# Patient Record
Sex: FEMALE | Race: OTHER | HISPANIC OR LATINO
[De-identification: names, ages, dates, MRNs, and addresses within clinical notes are randomized per-mention and may not be internally consistent; named-entity substitution may affect disease eponyms.]

---

## 2019-01-31 ENCOUNTER — LABORATORY RESULT (OUTPATIENT)
Age: 44
End: 2019-01-31

## 2019-01-31 ENCOUNTER — APPOINTMENT (OUTPATIENT)
Dept: OBGYN | Facility: CLINIC | Age: 44
End: 2019-01-31
Payer: COMMERCIAL

## 2019-01-31 VITALS
HEIGHT: 67 IN | BODY MASS INDEX: 28.56 KG/M2 | WEIGHT: 182 LBS | SYSTOLIC BLOOD PRESSURE: 130 MMHG | DIASTOLIC BLOOD PRESSURE: 80 MMHG

## 2019-01-31 DIAGNOSIS — O36.8190 DECREASED FETAL MOVEMENTS, UNSPECIFIED TRIMESTER, NOT APPLICABLE OR UNSPECIFIED: ICD-10-CM

## 2019-01-31 DIAGNOSIS — Z33.2 ENCOUNTER FOR ELECTIVE TERMINATION OF PREGNANCY: ICD-10-CM

## 2019-01-31 DIAGNOSIS — N94.89 OTHER SPECIFIED CONDITIONS ASSOCIATED WITH FEMALE GENITAL ORGANS AND MENSTRUAL CYCLE: ICD-10-CM

## 2019-01-31 DIAGNOSIS — Z30.09 ENCOUNTER FOR OTHER GENERAL COUNSELING AND ADVICE ON CONTRACEPTION: ICD-10-CM

## 2019-01-31 DIAGNOSIS — Z71.7 HUMAN IMMUNODEFICIENCY VIRUS [HIV] COUNSELING: ICD-10-CM

## 2019-01-31 DIAGNOSIS — E78.00 PURE HYPERCHOLESTEROLEMIA, UNSPECIFIED: ICD-10-CM

## 2019-01-31 DIAGNOSIS — Z29.13 ENCOUNTER FOR PROPHYLACTIC RHO(D) IMMUNE GLOBULIN: ICD-10-CM

## 2019-01-31 DIAGNOSIS — O09.529 SUPERVISION OF ELDERLY MULTIGRAVIDA, UNSPECIFIED TRIMESTER: ICD-10-CM

## 2019-01-31 DIAGNOSIS — L29.2 PRURITUS VULVAE: ICD-10-CM

## 2019-01-31 DIAGNOSIS — Z34.90 ENCOUNTER FOR SUPERVISION OF NORMAL PREGNANCY, UNSPECIFIED, UNSPECIFIED TRIMESTER: ICD-10-CM

## 2019-01-31 PROCEDURE — 99396 PREV VISIT EST AGE 40-64: CPT

## 2019-01-31 NOTE — HISTORY OF PRESENT ILLNESS
[___ Year(s) Ago] : [unfilled] year(s) ago [Good] : being in good health [Reproductive Age] : is of reproductive age [Definite:  ___ (Date)] : the last menstrual period was [unfilled] [Normal Amount/Duration] : was of a normal amount and duration [Regular Cycle Intervals] : periods have been regular [Frequency: Q ___ days] : menstrual periods occur approximately every [unfilled] days [Menarche Age: ____] : age at menarche was [unfilled] [Age of First Sexual [place] ___] : became sexually active at the age of [unfilled] [Male ___] : [unfilled] male [de-identified] : April 2016 [Continuous] : no continuous [Dull] : no dull [Sharp] : no sharp [Stabbing] : no stabbing [Throbbing] : no throbbing [Burning] : no burning [Itching] : no itching [Mass] : no mass [Stinging] : no stinging [Lesion] : no lesion [Soreness] : no soreness [Discharge] : no discharge [Localized Pain] : no localized pain [Mass (___cm)] : no palpable mass [Diffused Pain] : no diffused pain [Nipple Discharge] : no nipple discharge [Skin Color Change] : no skin color change [FreeTextEntry9] : the patient denies any breast complaints [Hot Flashes] : no hot flashes [Night Sweats] : no night sweats [Vaginal Itching] : no vaginal itching [Dyspareunia] : no dyspareunia [Mood Changes] : no mood changes [FreeTextEntry8] : the patient denies any menopausal symptoms [Spotting Between  Menses] : no spotting between menses [Menstrual Cramps] : no menstrual cramps [Currently In Menopause] : not currently in menopause [Experiencing Menopausal Sxs] : not experiencing menopausal symptoms [Sexually Active] : is not sexually active [Contraception] : does not use contraception [de-identified] : Current partner x 2014 >  October 2018      patient is currently not sexually active

## 2019-01-31 NOTE — CHIEF COMPLAINT
[Annual Visit] : annual visit [FreeTextEntry1] : Patient presents for annual GYN evaluation\par Patient reports one year history of mixed incontinence - stress urinary incontinence with coughing laughing sneezing, intermittent urge incontinence.  Patient has never yet lost urine after urge - has been able to hold it until she reaches the bathroom.  She reports nocturia twice per night.\par \par LMP 1/17\par Patient is no longer sexually active -  from her partner since October 2018\par \par

## 2019-01-31 NOTE — OB HISTORY
[Pregnancy History] : boy [___] : no pregnancy complications reported [FreeTextEntry1] : prenatal issues\par low lying placenta\par marginal cord insertion\par elderly multigravida\par rhogam candidate

## 2019-01-31 NOTE — PHYSICAL EXAM
[Awake] : awake [Alert] : alert [Acute Distress] : no acute distress [LAD] : no lymphadenopathy [Thyroid Nodule] : no thyroid nodule [Goiter] : no goiter [Mass] : no breast mass [Nipple Discharge] : no nipple discharge [Axillary LAD] : no axillary lymphadenopathy [Soft] : soft [Tender] : non tender [Distended] : not distended [H/Smegaly] : no hepatosplenomegaly [Oriented x3] : oriented to person, place, and time [Depressed Mood] : not depressed [Flat Affect] : affect not flat [Labia Majora] : labia major [Labia Minora] : labia minora [Normal] : clitoris [No Bleeding] : there was no active vaginal bleeding [Discharge] : had no discharge [IUD String] : did not have an IUD string protruding out [Pap Obtained] : a Pap smear was performed [Motion Tenderness] : there was no cervical motion tenderness [Anteversion] : anteverted [Tenderness] : nontender [Enlarged ___ wks] : not enlarged [Mass ___ cm] : no uterine mass was palpated [Uterine Adnexae] : were not tender and not enlarged [No Tenderness] : no rectal tenderness

## 2019-02-01 DIAGNOSIS — R92.2 INCONCLUSIVE MAMMOGRAM: ICD-10-CM

## 2019-02-03 LAB
C TRACH RRNA SPEC QL NAA+PROBE: NOT DETECTED
N GONORRHOEA RRNA SPEC QL NAA+PROBE: NOT DETECTED
SOURCE AMPLIFICATION: NORMAL

## 2019-02-07 ENCOUNTER — CLINICAL ADVICE (OUTPATIENT)
Age: 44
End: 2019-02-07

## 2019-02-11 ENCOUNTER — RESULT REVIEW (OUTPATIENT)
Age: 44
End: 2019-02-11

## 2019-02-21 PROBLEM — R92.2 INCONCLUSIVE MAMMOGRAM: Status: ACTIVE | Noted: 2019-02-07

## 2019-02-21 LAB — HPV HIGH+LOW RISK DNA PNL CVX: DETECTED

## 2019-03-05 ENCOUNTER — APPOINTMENT (OUTPATIENT)
Dept: OBGYN | Facility: CLINIC | Age: 44
End: 2019-03-05
Payer: COMMERCIAL

## 2019-03-05 ENCOUNTER — TRANSCRIPTION ENCOUNTER (OUTPATIENT)
Age: 44
End: 2019-03-05

## 2019-03-05 VITALS
DIASTOLIC BLOOD PRESSURE: 80 MMHG | HEIGHT: 67 IN | SYSTOLIC BLOOD PRESSURE: 132 MMHG | BODY MASS INDEX: 28.56 KG/M2 | WEIGHT: 182 LBS

## 2019-03-05 LAB
CYTOLOGY CVX/VAG DOC THIN PREP: NORMAL
HCG UR QL: NEGATIVE
QUALITY CONTROL: YES

## 2019-03-05 PROCEDURE — 81025 URINE PREGNANCY TEST: CPT

## 2019-03-05 PROCEDURE — 57454 BX/CURETT OF CERVIX W/SCOPE: CPT

## 2019-03-05 NOTE — END OF VISIT
[FreeTextEntry3] : \par \par Adequate colposcopy\par Nonstaining area with Lugol solution noted at 3:00 position\par Biopsy performed\par ECC performed\par Disposition pending biopsy results

## 2019-03-05 NOTE — PROCEDURE
[Colposcopy] : colposcopy [HPV high risk] : PCR positive for high risk HPV [LGSIL] : low grade squamous intraepithelial lesion [Patient] : patient [Risks] : risks [Benefits] : benefits [Alternatives] : alternatives [Infection] : infection [Bleeding] : bleeding [Allergic Reaction] : allergic reaction [Consent Obtained] : written consent was obtained prior to the procedure [Tolerated Well] : the patient tolerated the procedure well [No Complications] : there were no complications [Pap Performed] : a cervical Pap smear was not performed [No Abnormalities] : no abnormalities [Non-staining ___ o'clock] : no staining at [unfilled] o'clock [Biopsies Taken: # ___] : [unfilled] biopsies taken of the cervix [Biopsy Locations ___ o'clock] : the biopsies were taken at [unfilled] o'clock [ECC Done] : Endocervical curettage was performed.  [Alicja's] : Alicja's solution

## 2019-03-08 LAB — CORE LAB BIOPSY: NORMAL

## 2019-08-05 ENCOUNTER — OTHER (OUTPATIENT)
Age: 44
End: 2019-08-05

## 2020-02-18 ENCOUNTER — APPOINTMENT (OUTPATIENT)
Dept: OBGYN | Facility: CLINIC | Age: 45
End: 2020-02-18
Payer: COMMERCIAL

## 2020-02-18 VITALS
HEIGHT: 67 IN | SYSTOLIC BLOOD PRESSURE: 116 MMHG | WEIGHT: 183 LBS | BODY MASS INDEX: 28.72 KG/M2 | DIASTOLIC BLOOD PRESSURE: 82 MMHG

## 2020-02-18 DIAGNOSIS — R87.810 CERVICAL HIGH RISK HUMAN PAPILLOMAVIRUS (HPV) DNA TEST POSITIVE: ICD-10-CM

## 2020-02-18 DIAGNOSIS — Z01.419 ENCOUNTER FOR GYNECOLOGICAL EXAMINATION (GENERAL) (ROUTINE) W/OUT ABNORMAL FINDINGS: ICD-10-CM

## 2020-02-18 PROCEDURE — 99396 PREV VISIT EST AGE 40-64: CPT

## 2020-02-18 NOTE — HISTORY OF PRESENT ILLNESS
[Last Pap ___] : Last cervical pap smear was [unfilled] [Last Mammogram ___] : Last Mammogram was [unfilled] [Reproductive Age] : is of reproductive age

## 2020-02-18 NOTE — CHIEF COMPLAINT
[Annual Visit] : annual visit [FreeTextEntry1] : History of LGSIL\par negative colposocpy \par Patient is not sexually active and does not want birth control

## 2020-02-18 NOTE — PHYSICAL EXAM
[Awake] : awake [Acute Distress] : no acute distress [Alert] : alert [Mass] : no breast mass [Nipple Discharge] : no nipple discharge [Axillary LAD] : no axillary lymphadenopathy [Tender] : non tender [Soft] : soft [Oriented x3] : oriented to person, place, and time [Normal] : vagina [Uterine Adnexae] : were not tender and not enlarged [No Bleeding] : there was no active vaginal bleeding

## 2020-02-19 LAB — HPV HIGH+LOW RISK DNA PNL CVX: NOT DETECTED

## 2020-02-24 LAB — CYTOLOGY CVX/VAG DOC THIN PREP: NORMAL

## 2020-07-13 DIAGNOSIS — R10.2 PELVIC AND PERINEAL PAIN: ICD-10-CM

## 2020-07-17 ENCOUNTER — APPOINTMENT (OUTPATIENT)
Dept: OBGYN | Facility: CLINIC | Age: 45
End: 2020-07-17
Payer: COMMERCIAL

## 2020-07-17 VITALS
DIASTOLIC BLOOD PRESSURE: 83 MMHG | SYSTOLIC BLOOD PRESSURE: 129 MMHG | HEIGHT: 67 IN | WEIGHT: 184.5 LBS | BODY MASS INDEX: 28.96 KG/M2

## 2020-07-17 PROCEDURE — 99213 OFFICE O/P EST LOW 20 MIN: CPT

## 2020-09-02 ENCOUNTER — APPOINTMENT (OUTPATIENT)
Dept: OBGYN | Facility: CLINIC | Age: 45
End: 2020-09-02
Payer: COMMERCIAL

## 2020-09-02 VITALS
SYSTOLIC BLOOD PRESSURE: 108 MMHG | WEIGHT: 181 LBS | BODY MASS INDEX: 28.41 KG/M2 | HEIGHT: 67 IN | DIASTOLIC BLOOD PRESSURE: 70 MMHG

## 2020-09-02 PROCEDURE — 99213 OFFICE O/P EST LOW 20 MIN: CPT

## 2020-09-03 LAB — CANCER AG125 SERPL-ACNC: 31 U/ML

## 2020-10-15 ENCOUNTER — FORM ENCOUNTER (OUTPATIENT)
Age: 45
End: 2020-10-15

## 2020-10-16 ENCOUNTER — APPOINTMENT (OUTPATIENT)
Dept: OBGYN | Facility: CLINIC | Age: 45
End: 2020-10-16
Payer: COMMERCIAL

## 2020-10-16 PROCEDURE — 99243 OFF/OP CNSLTJ NEW/EST LOW 30: CPT

## 2020-11-26 ENCOUNTER — FORM ENCOUNTER (OUTPATIENT)
Age: 45
End: 2020-11-26

## 2021-01-18 ENCOUNTER — FORM ENCOUNTER (OUTPATIENT)
Age: 46
End: 2021-01-18

## 2021-01-29 ENCOUNTER — INPATIENT (INPATIENT)
Facility: HOSPITAL | Age: 46
LOS: 1 days | Discharge: HOME | DRG: 872 | End: 2021-01-31
Attending: OBSTETRICS & GYNECOLOGY | Admitting: OBSTETRICS & GYNECOLOGY
Payer: COMMERCIAL

## 2021-01-29 VITALS
SYSTOLIC BLOOD PRESSURE: 119 MMHG | HEIGHT: 67 IN | WEIGHT: 184.09 LBS | DIASTOLIC BLOOD PRESSURE: 79 MMHG | OXYGEN SATURATION: 100 % | RESPIRATION RATE: 17 BRPM | HEART RATE: 140 BPM | TEMPERATURE: 98 F

## 2021-01-29 DIAGNOSIS — Z98.890 OTHER SPECIFIED POSTPROCEDURAL STATES: Chronic | ICD-10-CM

## 2021-01-29 DIAGNOSIS — N80.9 ENDOMETRIOSIS, UNSPECIFIED: ICD-10-CM

## 2021-01-29 LAB
ALBUMIN SERPL ELPH-MCNC: 4.4 G/DL — SIGNIFICANT CHANGE UP (ref 3.3–5)
ALP SERPL-CCNC: 100 U/L — SIGNIFICANT CHANGE UP (ref 40–120)
ALT FLD-CCNC: 20 U/L — SIGNIFICANT CHANGE UP (ref 10–45)
ANION GAP SERPL CALC-SCNC: 13 MMOL/L — SIGNIFICANT CHANGE UP (ref 5–17)
APPEARANCE UR: CLEAR — SIGNIFICANT CHANGE UP
APTT BLD: 28 SEC — SIGNIFICANT CHANGE UP (ref 27.5–35.5)
AST SERPL-CCNC: 17 U/L — SIGNIFICANT CHANGE UP (ref 10–40)
BACTERIA # UR AUTO: NEGATIVE — SIGNIFICANT CHANGE UP
BASE EXCESS BLDV CALC-SCNC: 0.4 MMOL/L — SIGNIFICANT CHANGE UP (ref -2–2)
BASOPHILS # BLD AUTO: 0.02 K/UL — SIGNIFICANT CHANGE UP (ref 0–0.2)
BASOPHILS # BLD AUTO: 0.04 K/UL — SIGNIFICANT CHANGE UP (ref 0–0.2)
BASOPHILS NFR BLD AUTO: 0.1 % — SIGNIFICANT CHANGE UP (ref 0–2)
BASOPHILS NFR BLD AUTO: 0.2 % — SIGNIFICANT CHANGE UP (ref 0–2)
BILIRUB SERPL-MCNC: 1.7 MG/DL — HIGH (ref 0.2–1.2)
BILIRUB UR-MCNC: NEGATIVE — SIGNIFICANT CHANGE UP
BUN SERPL-MCNC: 21 MG/DL — SIGNIFICANT CHANGE UP (ref 7–23)
CA-I SERPL-SCNC: 1.26 MMOL/L — SIGNIFICANT CHANGE UP (ref 1.12–1.3)
CALCIUM SERPL-MCNC: 10.3 MG/DL — SIGNIFICANT CHANGE UP (ref 8.4–10.5)
CHLORIDE BLDV-SCNC: 105 MMOL/L — SIGNIFICANT CHANGE UP (ref 96–108)
CHLORIDE SERPL-SCNC: 98 MMOL/L — SIGNIFICANT CHANGE UP (ref 96–108)
CO2 BLDV-SCNC: 28 MMOL/L — SIGNIFICANT CHANGE UP (ref 22–30)
CO2 SERPL-SCNC: 24 MMOL/L — SIGNIFICANT CHANGE UP (ref 22–31)
COLOR SPEC: YELLOW — SIGNIFICANT CHANGE UP
CREAT SERPL-MCNC: 1.18 MG/DL — SIGNIFICANT CHANGE UP (ref 0.5–1.3)
DIFF PNL FLD: ABNORMAL
EOSINOPHIL # BLD AUTO: 0 K/UL — SIGNIFICANT CHANGE UP (ref 0–0.5)
EOSINOPHIL # BLD AUTO: 0.07 K/UL — SIGNIFICANT CHANGE UP (ref 0–0.5)
EOSINOPHIL NFR BLD AUTO: 0 % — SIGNIFICANT CHANGE UP (ref 0–6)
EOSINOPHIL NFR BLD AUTO: 0.3 % — SIGNIFICANT CHANGE UP (ref 0–6)
EPI CELLS # UR: 4 /HPF — SIGNIFICANT CHANGE UP
GAS PNL BLDV: 138 MMOL/L — SIGNIFICANT CHANGE UP (ref 135–145)
GAS PNL BLDV: SIGNIFICANT CHANGE UP
GAS PNL BLDV: SIGNIFICANT CHANGE UP
GLUCOSE BLDV-MCNC: 98 MG/DL — SIGNIFICANT CHANGE UP (ref 70–99)
GLUCOSE SERPL-MCNC: 108 MG/DL — HIGH (ref 70–99)
GLUCOSE UR QL: NEGATIVE — SIGNIFICANT CHANGE UP
HCG SERPL-ACNC: <2 MIU/ML — SIGNIFICANT CHANGE UP
HCO3 BLDV-SCNC: 26 MMOL/L — SIGNIFICANT CHANGE UP (ref 21–29)
HCT VFR BLD CALC: 36.9 % — SIGNIFICANT CHANGE UP (ref 34.5–45)
HCT VFR BLD CALC: 43.5 % — SIGNIFICANT CHANGE UP (ref 34.5–45)
HCT VFR BLDA CALC: 45 % — SIGNIFICANT CHANGE UP (ref 39–50)
HGB BLD CALC-MCNC: 14.7 G/DL — SIGNIFICANT CHANGE UP (ref 11.5–15.5)
HGB BLD-MCNC: 12.3 G/DL — SIGNIFICANT CHANGE UP (ref 11.5–15.5)
HGB BLD-MCNC: 15 G/DL — SIGNIFICANT CHANGE UP (ref 11.5–15.5)
HYALINE CASTS # UR AUTO: 33 /LPF — HIGH (ref 0–2)
IMM GRANULOCYTES NFR BLD AUTO: 0.6 % — SIGNIFICANT CHANGE UP (ref 0–1.5)
IMM GRANULOCYTES NFR BLD AUTO: 0.6 % — SIGNIFICANT CHANGE UP (ref 0–1.5)
INR BLD: 1.32 RATIO — HIGH (ref 0.88–1.16)
KETONES UR-MCNC: ABNORMAL
LACTATE BLDV-MCNC: 1.7 MMOL/L — SIGNIFICANT CHANGE UP (ref 0.7–2)
LEUKOCYTE ESTERASE UR-ACNC: NEGATIVE — SIGNIFICANT CHANGE UP
LYMPHOCYTES # BLD AUTO: 1.07 K/UL — SIGNIFICANT CHANGE UP (ref 1–3.3)
LYMPHOCYTES # BLD AUTO: 1.42 K/UL — SIGNIFICANT CHANGE UP (ref 1–3.3)
LYMPHOCYTES # BLD AUTO: 5.2 % — LOW (ref 13–44)
LYMPHOCYTES # BLD AUTO: 7.4 % — LOW (ref 13–44)
MCHC RBC-ENTMCNC: 28.7 PG — SIGNIFICANT CHANGE UP (ref 27–34)
MCHC RBC-ENTMCNC: 29.8 PG — SIGNIFICANT CHANGE UP (ref 27–34)
MCHC RBC-ENTMCNC: 33.3 GM/DL — SIGNIFICANT CHANGE UP (ref 32–36)
MCHC RBC-ENTMCNC: 34.5 GM/DL — SIGNIFICANT CHANGE UP (ref 32–36)
MCV RBC AUTO: 86 FL — SIGNIFICANT CHANGE UP (ref 80–100)
MCV RBC AUTO: 86.5 FL — SIGNIFICANT CHANGE UP (ref 80–100)
MONOCYTES # BLD AUTO: 1.03 K/UL — HIGH (ref 0–0.9)
MONOCYTES # BLD AUTO: 1.26 K/UL — HIGH (ref 0–0.9)
MONOCYTES NFR BLD AUTO: 5.4 % — SIGNIFICANT CHANGE UP (ref 2–14)
MONOCYTES NFR BLD AUTO: 6.1 % — SIGNIFICANT CHANGE UP (ref 2–14)
NEUTROPHILS # BLD AUTO: 16.5 K/UL — HIGH (ref 1.8–7.4)
NEUTROPHILS # BLD AUTO: 18.11 K/UL — HIGH (ref 1.8–7.4)
NEUTROPHILS NFR BLD AUTO: 86.5 % — HIGH (ref 43–77)
NEUTROPHILS NFR BLD AUTO: 87.6 % — HIGH (ref 43–77)
NITRITE UR-MCNC: NEGATIVE — SIGNIFICANT CHANGE UP
NRBC # BLD: 0 /100 WBCS — SIGNIFICANT CHANGE UP (ref 0–0)
NRBC # BLD: 0 /100 WBCS — SIGNIFICANT CHANGE UP (ref 0–0)
PCO2 BLDV: 49 MMHG — SIGNIFICANT CHANGE UP (ref 35–50)
PH BLDV: 7.35 — SIGNIFICANT CHANGE UP (ref 7.35–7.45)
PH UR: 5.5 — SIGNIFICANT CHANGE UP (ref 5–8)
PLATELET # BLD AUTO: 210 K/UL — SIGNIFICANT CHANGE UP (ref 150–400)
PLATELET # BLD AUTO: 233 K/UL — SIGNIFICANT CHANGE UP (ref 150–400)
PO2 BLDV: 24 MMHG — LOW (ref 25–45)
POTASSIUM BLDV-SCNC: 3.9 MMOL/L — SIGNIFICANT CHANGE UP (ref 3.5–5.3)
POTASSIUM SERPL-MCNC: 3.8 MMOL/L — SIGNIFICANT CHANGE UP (ref 3.5–5.3)
POTASSIUM SERPL-SCNC: 3.8 MMOL/L — SIGNIFICANT CHANGE UP (ref 3.5–5.3)
PROT SERPL-MCNC: 8.3 G/DL — SIGNIFICANT CHANGE UP (ref 6–8.3)
PROT UR-MCNC: ABNORMAL
PROTHROM AB SERPL-ACNC: 15.6 SEC — HIGH (ref 10.6–13.6)
RBC # BLD: 4.29 M/UL — SIGNIFICANT CHANGE UP (ref 3.8–5.2)
RBC # BLD: 5.03 M/UL — SIGNIFICANT CHANGE UP (ref 3.8–5.2)
RBC # FLD: 12.8 % — SIGNIFICANT CHANGE UP (ref 10.3–14.5)
RBC # FLD: 12.8 % — SIGNIFICANT CHANGE UP (ref 10.3–14.5)
RBC CASTS # UR COMP ASSIST: 13 /HPF — HIGH (ref 0–4)
SAO2 % BLDV: 36 % — LOW (ref 67–88)
SARS-COV-2 RNA SPEC QL NAA+PROBE: SIGNIFICANT CHANGE UP
SODIUM SERPL-SCNC: 135 MMOL/L — SIGNIFICANT CHANGE UP (ref 135–145)
SP GR SPEC: 1.05 — HIGH (ref 1.01–1.02)
UROBILINOGEN FLD QL: ABNORMAL
WBC # BLD: 19.09 K/UL — HIGH (ref 3.8–10.5)
WBC # BLD: 20.68 K/UL — HIGH (ref 3.8–10.5)
WBC # FLD AUTO: 19.09 K/UL — HIGH (ref 3.8–10.5)
WBC # FLD AUTO: 20.68 K/UL — HIGH (ref 3.8–10.5)
WBC UR QL: 6 /HPF — HIGH (ref 0–5)

## 2021-01-29 PROCEDURE — 99285 EMERGENCY DEPT VISIT HI MDM: CPT

## 2021-01-29 PROCEDURE — 76830 TRANSVAGINAL US NON-OB: CPT | Mod: 26

## 2021-01-29 PROCEDURE — 93010 ELECTROCARDIOGRAM REPORT: CPT

## 2021-01-29 PROCEDURE — G1004: CPT

## 2021-01-29 PROCEDURE — 74177 CT ABD & PELVIS W/CONTRAST: CPT | Mod: 26,MG

## 2021-01-29 PROCEDURE — 71046 X-RAY EXAM CHEST 2 VIEWS: CPT | Mod: 26

## 2021-01-29 PROCEDURE — 99222 1ST HOSP IP/OBS MODERATE 55: CPT

## 2021-01-29 PROCEDURE — 93975 VASCULAR STUDY: CPT | Mod: 26

## 2021-01-29 RX ORDER — SODIUM CHLORIDE 9 MG/ML
1000 INJECTION INTRAMUSCULAR; INTRAVENOUS; SUBCUTANEOUS ONCE
Refills: 0 | Status: COMPLETED | OUTPATIENT
Start: 2021-01-29 | End: 2021-01-29

## 2021-01-29 RX ORDER — KETOROLAC TROMETHAMINE 30 MG/ML
15 SYRINGE (ML) INJECTION ONCE
Refills: 0 | Status: DISCONTINUED | OUTPATIENT
Start: 2021-01-29 | End: 2021-01-29

## 2021-01-29 RX ORDER — POLYETHYLENE GLYCOL 3350 17 G/17G
17 POWDER, FOR SOLUTION ORAL EVERY 12 HOURS
Refills: 0 | Status: DISCONTINUED | OUTPATIENT
Start: 2021-01-29 | End: 2021-01-31

## 2021-01-29 RX ORDER — ACETAMINOPHEN 500 MG
650 TABLET ORAL ONCE
Refills: 0 | Status: COMPLETED | OUTPATIENT
Start: 2021-01-29 | End: 2021-01-29

## 2021-01-29 RX ORDER — SODIUM CHLORIDE 9 MG/ML
1000 INJECTION, SOLUTION INTRAVENOUS
Refills: 0 | Status: DISCONTINUED | OUTPATIENT
Start: 2021-01-29 | End: 2021-01-31

## 2021-01-29 RX ORDER — PIPERACILLIN AND TAZOBACTAM 4; .5 G/20ML; G/20ML
3.38 INJECTION, POWDER, LYOPHILIZED, FOR SOLUTION INTRAVENOUS EVERY 8 HOURS
Refills: 0 | Status: DISCONTINUED | OUTPATIENT
Start: 2021-01-30 | End: 2021-01-31

## 2021-01-29 RX ORDER — KETOROLAC TROMETHAMINE 30 MG/ML
30 SYRINGE (ML) INJECTION EVERY 6 HOURS
Refills: 0 | Status: DISCONTINUED | OUTPATIENT
Start: 2021-01-29 | End: 2021-01-31

## 2021-01-29 RX ORDER — PIPERACILLIN AND TAZOBACTAM 4; .5 G/20ML; G/20ML
3.38 INJECTION, POWDER, LYOPHILIZED, FOR SOLUTION INTRAVENOUS ONCE
Refills: 0 | Status: COMPLETED | OUTPATIENT
Start: 2021-01-29 | End: 2021-01-29

## 2021-01-29 RX ORDER — DIPHENHYDRAMINE HCL 50 MG
25 CAPSULE ORAL EVERY 4 HOURS
Refills: 0 | Status: DISCONTINUED | OUTPATIENT
Start: 2021-01-29 | End: 2021-01-31

## 2021-01-29 RX ADMIN — Medication 15 MILLIGRAM(S): at 16:19

## 2021-01-29 RX ADMIN — PIPERACILLIN AND TAZOBACTAM 200 GRAM(S): 4; .5 INJECTION, POWDER, LYOPHILIZED, FOR SOLUTION INTRAVENOUS at 19:25

## 2021-01-29 RX ADMIN — SODIUM CHLORIDE 1000 MILLILITER(S): 9 INJECTION INTRAMUSCULAR; INTRAVENOUS; SUBCUTANEOUS at 14:10

## 2021-01-29 RX ADMIN — Medication 650 MILLIGRAM(S): at 20:04

## 2021-01-29 RX ADMIN — SODIUM CHLORIDE 1000 MILLILITER(S): 9 INJECTION INTRAMUSCULAR; INTRAVENOUS; SUBCUTANEOUS at 20:04

## 2021-01-29 RX ADMIN — SODIUM CHLORIDE 1000 MILLILITER(S): 9 INJECTION INTRAMUSCULAR; INTRAVENOUS; SUBCUTANEOUS at 16:12

## 2021-01-29 RX ADMIN — SODIUM CHLORIDE 1000 MILLILITER(S): 9 INJECTION INTRAMUSCULAR; INTRAVENOUS; SUBCUTANEOUS at 16:13

## 2021-01-29 NOTE — ED PROVIDER NOTE - ATTENDING CONTRIBUTION TO CARE
44 y/o f with pmhx ovarian cysts, presents for worsening abd pain. had similar pain in Aug and Sept and Dec. patient last had US ib 12/24 which demonstrated right side cyst with mod large size, and left ovary with 2 smaller cysts. + nausea. no vomiting. used hot pack therapy with some relief, current pain is 5/10. no urinary complaints. no diarrhea. last bm was yest. no headache. pain is on both sides but greater on right side.  she called Dr. Vidya stephenson her OB who rec she come to ED. has schedule surg on 2/16/21 for lap ovarian cystectomy.  Gen.  no resp distress, mild discomfort from pain  HEENT:  perrl eomi  Lungs:  b/l bs  CVS: S1S2   Abd;  soft  no distention, tender on right side greater than left side. no suprapubic tend. no cva tender. no guarding. min ttp on right side.   Ext: no pitting edema no erythema  Neuro: aaox3 no focal deficits  MSK: moves all ext spon.

## 2021-01-29 NOTE — ED PROVIDER NOTE - PROGRESS NOTE DETAILS
Pt offered pain meds but declined at this time  Vivienne West PA-C Pura Baca MD HR in the 160s, pt reached down to grab something; reports buttock pain, she is currently getting 1L of IVF infused she will get ketorolac, ekg obtained w/ HR in the 110s, sinus, Spoke to GYN again now that all images returned,  will see patient shortly   Vivienne West PA-C Pura Baca MD seen by Dr. Fonseca, recommending zosyn and admission to gyn

## 2021-01-29 NOTE — ED PROVIDER NOTE - PHYSICAL EXAMINATION
CONSTITUTIONAL: Patient is awake, alert and oriented x 3. Patient is well appearing and in no acute distress  HEAD: NCAT   NECK: supple, FROM  LUNGS: CTA B/L  HEART: RRR  ABDOMEN: Soft, non-distended, mild diffuse ttp in lower half abdomen, no rebound or guarding  EXTREMITY: no edema or calf tenderness b/l, FROM upper and lower ext b/l  SKIN: with no rash or lesions.   NEURO: No focal deficits CONSTITUTIONAL: Patient is awake, alert and oriented x 3. Patient is well appearing and in no acute distress  HEAD: NCAT   NECK: supple, FROM  LUNGS: CTA B/L  HEART: Tachy, RR  ABDOMEN: Soft, non-distended, mild diffuse ttp in lower half abdomen, no rebound or guarding  EXTREMITY: no edema or calf tenderness b/l, FROM upper and lower ext b/l  SKIN: with no rash or lesions.   NEURO: No focal deficits CONSTITUTIONAL: Patient is awake, alert and oriented x 3. Patient is well appearing and in no acute distress  HEAD: NCAT   NECK: supple, FROM  LUNGS: CTA B/L  HEART: Tachy, RR  ABDOMEN: Soft, non-distended, mild diffuse ttp in lower half abdomen, no rebound or guarding  PELVIC: no vulvar lesions, some white/clear discharge on speculum exam. on bimanual exam no cmt, + right adnexal ttp   EXTREMITY: no edema or calf tenderness b/l, FROM upper and lower ext b/l  SKIN: with no rash or lesions.   NEURO: No focal deficits

## 2021-01-29 NOTE — H&P ADULT - NSHPLABSRESULTS_GEN_ALL_CORE
15.0   20.68 )-----------( 233      ( 2021 12:12 )             43.5           135  |  98  |  21  ----------------------------<  108<H>  3.8   |  24  |  1.18    Ca    10.3      2021 12:12    TPro  8.3  /  Alb  4.4  /  TBili  1.7<H>  /  DBili  x   /  AST  17  /  ALT  20  /  AlkPhos  100                Urinalysis Basic - ( 2021 15:47 )    Color: Yellow / Appearance: Clear / S.049 / pH: x  Gluc: x / Ketone: Small  / Bili: Negative / Urobili: 3 mg/dL   Blood: x / Protein: 30 mg/dL / Nitrite: Negative   Leuk Esterase: Negative / RBC: 13 /hpf / WBC 6 /HPF   Sq Epi: x / Non Sq Epi: 4 /hpf / Bacteria: Negative    PT/INR - ( 2021 12:12 )   PT: 15.6 sec;   INR: 1.32 ratio       PTT - ( 2021 12:12 )  PTT:28.0 sec              RADIOLOGY & ADDITIONAL STUDIES:  < from: US Transvaginal (21 @ 12:58) >  EXAM:  US TRANSVAGINAL                        EXAM:  US DPLX PELVIC                            PROCEDURE DATE:  2021        INTERPRETATION:  CLINICAL INFORMATION: Lower abdominal pain. History of bilateral ovarian cystic lesions. Leukocytosis.    LMP: 2021.    COMPARISON: Outside report from 2020.    TECHNIQUE:  Endovaginal pelvic sonogram as per order. Transabdominal pelvic sonogram was also performed as part of our standard protocol. Color and Spectral Doppler was performed.    FINDINGS:    Uterus: 7.0 x 3.9 x 5.3 cm. Within normal limits.  Endometrium: 10 mm. Within normal limits in this premenopausal female.    Right ovary: poorly delineated, measures approximately 5.9 cm x 4.2 cm x 5.0 cm. There is a 4.9 x 4.0 x 3.3 cm cystic structure with a fluid-fluid level (layering echogenic material), which may represent a hemorrhagic cyst versus endometrioma. Fat is considered less likely. Normal arterial and venous waveform in the region of the right ovary.    Left ovary: poorly delineated, measures approximately 4.6 cm x 4.9 cm x 3.0 cm. Two cystic structures with low level internal echoes, measuring 2.4 x 1.7 x 1.8 cm and 2.3 x 2.0 cm, which were mentioned in prior ultrasound report, not significantly changed in size. Normal arterial and venous waveforms.    There is a 9.0 x 4.1 x 4.5 cm collection with thick septations and no apparent internal vascularity, located anterior to the uterus, which is indeterminate, possibilities include infection/pelvic inflammatory disease (taking into account patient's leukocytosis), ruptured hemorrhagic cyst with hemorrhage    Fluid: Trace free fluid in the pelvis.    IMPRESSION:  Enlarged bilateral ovaries. A 4.9 cm probably hemorrhagic cyst versus endometrioma in the right ovary. Two hemorrhagic cysts versus endometriomas in the left ovary.    A 9.0 x 4.1 x 4.5 cm complex collection anterior to the uterus, possibilities include infection/pelvic inflammatory disease, hematoma from ruptured hemorrhagic cyst, less likely ruptured ectopic pregnancy  (HCG level is recommended).    Pelvic MRI can be performed for further evaluation.    < end of copied text >

## 2021-01-29 NOTE — H&P ADULT - HISTORY OF PRESENT ILLNESS
DANIEL CESAR is a 45y  who presented to the ED with a chief complaint of worsening abdominal pain for the past few days. She has known hx of endometriosis and bilateral endometriomas with scheduled outpt surgery for resection on . On presentation to the ED pt has been tachy to 140s and labs notable for WBC of 20. She endorses some constipation but otherwise denies any CP/SOB/fever/chills/dysuria/vaginal bleeding/vaginal discharge. CTAP notable for R ovarian mass measuring 5x7cm and L ovarian mass measuring 3x4cm (consistent in size and character of her known endometriomas) and small free fluid with adjacent fat stranding.      OB/GYN HISTORY:     LMP: 21  (+) Trich > 20yrs ago, no STIs since then   Pt denies any hx of fibroids, (+) HPV, no hx of abnormal pap                                               Allergies:  No Known Allergies/Intolerances        PAST MEDICAL & SURGICAL HISTORY:  Gallstones  with surgery to remove gallstones  H/O oral surgery

## 2021-01-29 NOTE — ED ADULT NURSE NOTE - OBJECTIVE STATEMENT
45 year old female, a+ox4, presents to ED complaining of diffuse abd pain that started yesterday, but worsening today.  Pt has a bilateral ovarian cystectomy scheduled for 2/16/21.  Pt has been having ovarian cysts and pain associated with bilateral lower abd pain since November 2020.  Today presents with RLQ worse than left lower quadrant but pain is over generalized abd area. pt vomited 2 times after the pain started yesterday.  Currently not nauseous, no diarrhea.  LBM was 3 days ago (pt BMs are normally irregular).  Pt states her menstrual cycle is regular.  Lungs CTA unlabored. Abd soft tender, nondistended. no fevers, vaginal discharge, no sob, cough, chest pain, or bodyaches. skin w/d/i

## 2021-01-29 NOTE — H&P ADULT - NSHPREVIEWOFSYSTEMS_GEN_ALL_CORE
CONSTITUTIONAL: No weakness, fevers or chills  RESPIRATORY: No shortness of breath  CARDIOVASCULAR: No chest pain or palpitations  GASTROINTESTINAL: No nausea, vomiting; No diarrhea, (+) constipation  GENITOURINARY: No dysuria, frequency or hematuria  All other review of systems is negative unless indicated above

## 2021-01-29 NOTE — ED CLERICAL - NS ED CLERK NOTE PRE-ARRIVAL INFORMATION; ADDITIONAL PRE-ARRIVAL INFORMATION
CC/Reason For referral: Severe abdominal pain/vomiting. Patient has an of ruptured cysts.  Preferred Consultant(if applicable):  Who admits for you (if needed):  Do you have documents you would like to fax over? No  Would you still like to speak to an ED attending? [facility] Oncall Dr. Fonseca

## 2021-01-29 NOTE — H&P ADULT - NSHPPHYSICALEXAM_GEN_ALL_CORE
Vital Signs Last 24 Hrs  T(C): 36.7 (29 Jan 2021 10:40), Max: 36.7 (29 Jan 2021 10:40)  T(F): 98.1 (29 Jan 2021 10:40), Max: 98.1 (29 Jan 2021 10:40)  HR: 125 (29 Jan 2021 19:02) (109 - 144)  BP: 111/44 (29 Jan 2021 19:02) (107/47 - 124/79)  BP(mean): --  RR: 18 (29 Jan 2021 19:02) (16 - 18)  SpO2: 100% (29 Jan 2021 19:02) (100% - 100%)    PHYSICAL EXAM:  Constitutional: NAD, awake and alert, well-developed  HEENT: Normal Hearing  Respiratory: Breath sounds are clear bilaterally, No wheezing, rales or rhonchi  Cardiovascular: S1 and S2, regular rate and rhythm, no Murmurs, gallops or rubs  Gastrointestinal: soft, obese, moderately tender to palpation of bilateral lower quadrants and suprapubic region, nondistended, no guarding, no rebound  Genitourinary: no vaginal bleeding/foul smelling discharge, vaginal fluid culture and GC/CT obtained, cervical os closed, (-) CMT, tender on bimanual exam    Extremities: No peripheral edema, no popliteal masses, (-) lezama bilaterally   Neurological: A/O x 3, no focal deficits  Skin: No rashes

## 2021-01-29 NOTE — ED PROVIDER NOTE - OBJECTIVE STATEMENT
45 year old female with pmhx ruptured ovarian cyst presents to ED c/o lower abdominal pain starting yesterday at 4pm. Pt was folding laundry when she had sudden onset lower abdominal pain which feels very similar to prior ruptured ovarian cyst. States that she had one episode vomiting as well. Took Aleve yesterday with minimal relief and placed hot packs. Pain persisted into today and she spoke to her GYN who advised her to come to ED for eval. Pt is scheduled for ovarian cystectomy 2/16. Denies HA, dizziness, chest pain, sob, fevers, chills, diarrhea, urinary symptoms. She states she is due for her menstrual period in the next few days, denies current vaginal bleeding or spotting

## 2021-01-29 NOTE — H&P ADULT - ASSESSMENT
DANIEL CESAR is a 45y with known hx of endometriosis and bilateral endometriomas who presented to the ED with abdominal pain, tachycardia, elevated WBC count, and CTAP showing R ovarian endometrioma measuring 5x7cm and L ovarian endometrioma measuring 3x4cm with fat stranding surrounding pelvic structures. Pt to be admitted for management of infected endometriomas/PID.    Plan:   - IV zosyn  - 1L IVF bolus followed by LR @ 125ml/hr   - Toradol q6 prn for pain management   - Miralax for constipation    Pt assessed and plan of care discussed with Dr. Fonseca.

## 2021-01-29 NOTE — ED ADULT NURSE REASSESSMENT NOTE - NS ED NURSE REASSESS COMMENT FT1
Pt received from ANGELINA Casarez. Resting on stretcher, endorsing some back pain. HR elevated to 130s, MD Baca made aware, HR elevated since arrival no interventions at this time. A&Ox4, PALOMARES, distal pulses intact, abdomen soft nontender, skin intact. Side rails up for safety, bed in lowest position, call bell and personal items within reach, instructed to call for assistance, verbalizes understanding.  Will continue to monitor.

## 2021-01-30 DIAGNOSIS — A41.9 SEPSIS, UNSPECIFIED ORGANISM: ICD-10-CM

## 2021-01-30 LAB
BASOPHILS # BLD AUTO: 0.04 K/UL — SIGNIFICANT CHANGE UP (ref 0–0.2)
BASOPHILS NFR BLD AUTO: 0.3 % — SIGNIFICANT CHANGE UP (ref 0–2)
C TRACH RRNA SPEC QL NAA+PROBE: SIGNIFICANT CHANGE UP
CULTURE RESULTS: SIGNIFICANT CHANGE UP
EOSINOPHIL # BLD AUTO: 0.02 K/UL — SIGNIFICANT CHANGE UP (ref 0–0.5)
EOSINOPHIL NFR BLD AUTO: 0.1 % — SIGNIFICANT CHANGE UP (ref 0–6)
HCT VFR BLD CALC: 34.2 % — LOW (ref 34.5–45)
HGB BLD-MCNC: 11.3 G/DL — LOW (ref 11.5–15.5)
IMM GRANULOCYTES NFR BLD AUTO: 0.8 % — SIGNIFICANT CHANGE UP (ref 0–1.5)
LYMPHOCYTES # BLD AUTO: 1.33 K/UL — SIGNIFICANT CHANGE UP (ref 1–3.3)
LYMPHOCYTES # BLD AUTO: 9 % — LOW (ref 13–44)
MCHC RBC-ENTMCNC: 28.7 PG — SIGNIFICANT CHANGE UP (ref 27–34)
MCHC RBC-ENTMCNC: 33 GM/DL — SIGNIFICANT CHANGE UP (ref 32–36)
MCV RBC AUTO: 86.8 FL — SIGNIFICANT CHANGE UP (ref 80–100)
MONOCYTES # BLD AUTO: 0.95 K/UL — HIGH (ref 0–0.9)
MONOCYTES NFR BLD AUTO: 6.4 % — SIGNIFICANT CHANGE UP (ref 2–14)
N GONORRHOEA RRNA SPEC QL NAA+PROBE: SIGNIFICANT CHANGE UP
NEUTROPHILS # BLD AUTO: 12.34 K/UL — HIGH (ref 1.8–7.4)
NEUTROPHILS NFR BLD AUTO: 83.4 % — HIGH (ref 43–77)
NRBC # BLD: 0 /100 WBCS — SIGNIFICANT CHANGE UP (ref 0–0)
PLATELET # BLD AUTO: 187 K/UL — SIGNIFICANT CHANGE UP (ref 150–400)
RBC # BLD: 3.94 M/UL — SIGNIFICANT CHANGE UP (ref 3.8–5.2)
RBC # FLD: 12.9 % — SIGNIFICANT CHANGE UP (ref 10.3–14.5)
SPECIMEN SOURCE: SIGNIFICANT CHANGE UP
SPECIMEN SOURCE: SIGNIFICANT CHANGE UP
WBC # BLD: 14.8 K/UL — HIGH (ref 3.8–10.5)
WBC # FLD AUTO: 14.8 K/UL — HIGH (ref 3.8–10.5)

## 2021-01-30 PROCEDURE — 99232 SBSQ HOSP IP/OBS MODERATE 35: CPT

## 2021-01-30 RX ORDER — MAGNESIUM HYDROXIDE 400 MG/1
30 TABLET, CHEWABLE ORAL DAILY
Refills: 0 | Status: DISCONTINUED | OUTPATIENT
Start: 2021-01-30 | End: 2021-01-31

## 2021-01-30 RX ORDER — HEPARIN SODIUM 5000 [USP'U]/ML
5000 INJECTION INTRAVENOUS; SUBCUTANEOUS EVERY 12 HOURS
Refills: 0 | Status: DISCONTINUED | OUTPATIENT
Start: 2021-01-30 | End: 2021-01-31

## 2021-01-30 RX ORDER — ACETAMINOPHEN 500 MG
975 TABLET ORAL ONCE
Refills: 0 | Status: COMPLETED | OUTPATIENT
Start: 2021-01-30 | End: 2021-01-30

## 2021-01-30 RX ORDER — SODIUM CHLORIDE 9 MG/ML
1000 INJECTION, SOLUTION INTRAVENOUS ONCE
Refills: 0 | Status: COMPLETED | OUTPATIENT
Start: 2021-01-30 | End: 2021-01-30

## 2021-01-30 RX ORDER — LANOLIN ALCOHOL/MO/W.PET/CERES
3 CREAM (GRAM) TOPICAL AT BEDTIME
Refills: 0 | Status: DISCONTINUED | OUTPATIENT
Start: 2021-01-30 | End: 2021-01-31

## 2021-01-30 RX ORDER — ACETAMINOPHEN 500 MG
1000 TABLET ORAL ONCE
Refills: 0 | Status: COMPLETED | OUTPATIENT
Start: 2021-01-30 | End: 2021-01-30

## 2021-01-30 RX ORDER — SIMETHICONE 80 MG/1
80 TABLET, CHEWABLE ORAL
Refills: 0 | Status: DISCONTINUED | OUTPATIENT
Start: 2021-01-30 | End: 2021-01-31

## 2021-01-30 RX ORDER — LANOLIN ALCOHOL/MO/W.PET/CERES
3 CREAM (GRAM) TOPICAL ONCE
Refills: 0 | Status: COMPLETED | OUTPATIENT
Start: 2021-01-30 | End: 2021-01-30

## 2021-01-30 RX ADMIN — PIPERACILLIN AND TAZOBACTAM 25 GRAM(S): 4; .5 INJECTION, POWDER, LYOPHILIZED, FOR SOLUTION INTRAVENOUS at 17:14

## 2021-01-30 RX ADMIN — Medication 30 MILLIGRAM(S): at 12:50

## 2021-01-30 RX ADMIN — SODIUM CHLORIDE 125 MILLILITER(S): 9 INJECTION, SOLUTION INTRAVENOUS at 12:52

## 2021-01-30 RX ADMIN — Medication 975 MILLIGRAM(S): at 01:53

## 2021-01-30 RX ADMIN — Medication 400 MILLIGRAM(S): at 21:05

## 2021-01-30 RX ADMIN — HEPARIN SODIUM 5000 UNIT(S): 5000 INJECTION INTRAVENOUS; SUBCUTANEOUS at 20:06

## 2021-01-30 RX ADMIN — POLYETHYLENE GLYCOL 3350 17 GRAM(S): 17 POWDER, FOR SOLUTION ORAL at 00:15

## 2021-01-30 RX ADMIN — Medication 30 MILLIGRAM(S): at 18:21

## 2021-01-30 RX ADMIN — Medication 3 MILLIGRAM(S): at 21:04

## 2021-01-30 RX ADMIN — SODIUM CHLORIDE 1000 MILLILITER(S): 9 INJECTION, SOLUTION INTRAVENOUS at 08:45

## 2021-01-30 RX ADMIN — Medication 25 MILLIGRAM(S): at 00:15

## 2021-01-30 RX ADMIN — POLYETHYLENE GLYCOL 3350 17 GRAM(S): 17 POWDER, FOR SOLUTION ORAL at 09:01

## 2021-01-30 RX ADMIN — SIMETHICONE 80 MILLIGRAM(S): 80 TABLET, CHEWABLE ORAL at 02:41

## 2021-01-30 RX ADMIN — Medication 30 MILLIGRAM(S): at 00:43

## 2021-01-30 RX ADMIN — Medication 30 MILLIGRAM(S): at 06:22

## 2021-01-30 RX ADMIN — PIPERACILLIN AND TAZOBACTAM 25 GRAM(S): 4; .5 INJECTION, POWDER, LYOPHILIZED, FOR SOLUTION INTRAVENOUS at 00:43

## 2021-01-30 RX ADMIN — Medication 3 MILLIGRAM(S): at 08:45

## 2021-01-30 RX ADMIN — HEPARIN SODIUM 5000 UNIT(S): 5000 INJECTION INTRAVENOUS; SUBCUTANEOUS at 08:45

## 2021-01-30 RX ADMIN — PIPERACILLIN AND TAZOBACTAM 25 GRAM(S): 4; .5 INJECTION, POWDER, LYOPHILIZED, FOR SOLUTION INTRAVENOUS at 08:45

## 2021-01-30 NOTE — PROGRESS NOTE ADULT - PROBLEM SELECTOR PLAN 1
#Neuro: baseline back pain worsened with bed, Toradol ordered q6 prn and heating pads  #CV: Pt with tachycardia, 1L bolus ordered H/H 11/34  #Pulm: ROS and PE unremarkable  #ID: Afebrile, Zosyn initiated 1/29, Covid (-) on (1/29)  #GI: tolerating reg diet, no n/v  #: UOP low, 1L LR bolus ordered,  #FEN: LR @ 125. replete electrolytes prn   #Heme: HSQ, SCDs while in bed, and early ambulation encouraged for DVT prophylaxis    Disposition: continue routine care      Dilcia Hinojosa MD  OBGYN PGY2

## 2021-01-30 NOTE — PROGRESS NOTE ADULT - SUBJECTIVE AND OBJECTIVE BOX
#Neuro: baseline back pain worsened with bed, Toradol ordered q6 prn and heating pads  #CV: Pt with tachycardia  H/H wnl  #Pulm: ROS and PE unremarkable  #ID: Afebrile, Zosyn initiated 1/29, Covid (-) on (1/29)  #GI: tolerating reg diet, no n/v  #: UOP low, 1L LR bous ordered,  #FEN: LR @ 125. replete electrolytes prn   #Heme: HSQ, SCDs while in bed, and early ambulation encouraged for DVT prophylaxis    Disposition: continue routine care      Dilcia Hinojosa MD  OBGYN PGY2 Pt seen and examined at bedside. No overnight events.  Pt complaining of baseline back pain which is worsened by bed here, otherwise she states her abdominal pain is minimal and well controlled on current regimen. She denies SOB/CP/palpitations, fever/chills, nausea/emesis. Tolerating regular diet.  +OOB, +Voiding spontaneously but minimal output and (+) constipation     MEDICATIONS  (STANDING):  heparin   Injectable 5000 Unit(s) SubCutaneous every 12 hours  lactated ringers Bolus 1000 milliLiter(s) IV Bolus once  lactated ringers. 1000 milliLiter(s) (125 mL/Hr) IV Continuous <Continuous>  piperacillin/tazobactam IVPB.. 3.375 Gram(s) IV Intermittent every 8 hours  polyethylene glycol 3350 17 Gram(s) Oral every 12 hours    MEDICATIONS  (PRN):  diphenhydrAMINE 25 milliGRAM(s) Oral every 4 hours PRN Rash and/or Itching  ketorolac   Injectable 30 milliGRAM(s) IV Push every 6 hours PRN Moderate Pain (4 - 6)  magnesium hydroxide Suspension 30 milliLiter(s) Oral daily PRN Constipation  melatonin 3 milliGRAM(s) Oral once PRN Insomnia  simethicone 80 milliGRAM(s) Chew two times a day PRN Gas        Vital Signs Last 24 Hrs  T(C): 36.9 (30 Jan 2021 05:29), Max: 37 (29 Jan 2021 22:00)  T(F): 98.4 (30 Jan 2021 05:29), Max: 98.6 (29 Jan 2021 22:00)  HR: 110 (30 Jan 2021 05:29) (109 - 144)  BP: 95/65 (30 Jan 2021 05:29) (95/65 - 124/79)  BP(mean): --  RR: 18 (30 Jan 2021 05:29) (16 - 19)  SpO2: 97% (30 Jan 2021 05:29) (96% - 100%)    01-29 @ 07:01  -  01-30 @ 07:00  --------------------------------------------------------  IN: 1110 mL / OUT: 0 mL / NET: 1110 mL        PHYSICAL EXAM:  Gen: NAD, A+O x 3  CV: RRR  Pulm: CTA BL  Abd: Soft, minimally tender to deep palpation, obese, softly distended, no rebound or guarding  Extremities: No swelling or calf tenderness, (-) lezama bilaterally     Labs, additional tests:             11.3   14.80 )-----------( 187      ( 01-30 @ 06:57 )             34.2                12.3   19.09 )-----------( 210      ( 01-29 @ 20:31 )             36.9                15.0   20.68 )-----------( 233      ( 01-29 @ 12:12 )             43.5       01-29    135  |  98  |  21  ----------------------------<  108<H>  3.8   |  24  |  1.18    Ca    10.3      29 Jan 2021 12:12    TPro  8.3  /  Alb  4.4  /  TBili  1.7<H>  /  DBili  x   /  AST  17  /  ALT  20  /  AlkPhos  100  01-29

## 2021-01-31 ENCOUNTER — TRANSCRIPTION ENCOUNTER (OUTPATIENT)
Age: 46
End: 2021-01-31

## 2021-01-31 VITALS
HEART RATE: 94 BPM | SYSTOLIC BLOOD PRESSURE: 136 MMHG | OXYGEN SATURATION: 100 % | RESPIRATION RATE: 18 BRPM | DIASTOLIC BLOOD PRESSURE: 84 MMHG | TEMPERATURE: 99 F

## 2021-01-31 DIAGNOSIS — N80.9 ENDOMETRIOSIS, UNSPECIFIED: ICD-10-CM

## 2021-01-31 LAB
-  AMIKACIN: SIGNIFICANT CHANGE UP
-  AMOXICILLIN/CLAVULANIC ACID: SIGNIFICANT CHANGE UP
-  AMPICILLIN/SULBACTAM: SIGNIFICANT CHANGE UP
-  AMPICILLIN: SIGNIFICANT CHANGE UP
-  AZTREONAM: SIGNIFICANT CHANGE UP
-  CEFAZOLIN: SIGNIFICANT CHANGE UP
-  CEFEPIME: SIGNIFICANT CHANGE UP
-  CEFOXITIN: SIGNIFICANT CHANGE UP
-  CEFTRIAXONE: SIGNIFICANT CHANGE UP
-  CIPROFLOXACIN: SIGNIFICANT CHANGE UP
-  ERTAPENEM: SIGNIFICANT CHANGE UP
-  GENTAMICIN: SIGNIFICANT CHANGE UP
-  IMIPENEM: SIGNIFICANT CHANGE UP
-  LEVOFLOXACIN: SIGNIFICANT CHANGE UP
-  MEROPENEM: SIGNIFICANT CHANGE UP
-  PIPERACILLIN/TAZOBACTAM: SIGNIFICANT CHANGE UP
-  TOBRAMYCIN: SIGNIFICANT CHANGE UP
-  TRIMETHOPRIM/SULFAMETHOXAZOLE: SIGNIFICANT CHANGE UP
ALBUMIN SERPL ELPH-MCNC: 2.5 G/DL — LOW (ref 3.3–5)
ALP SERPL-CCNC: 74 U/L — SIGNIFICANT CHANGE UP (ref 40–120)
ALT FLD-CCNC: 11 U/L — SIGNIFICANT CHANGE UP (ref 10–45)
ANION GAP SERPL CALC-SCNC: 12 MMOL/L — SIGNIFICANT CHANGE UP (ref 5–17)
AST SERPL-CCNC: 10 U/L — SIGNIFICANT CHANGE UP (ref 10–40)
BASOPHILS # BLD AUTO: 0.01 K/UL — SIGNIFICANT CHANGE UP (ref 0–0.2)
BASOPHILS # BLD AUTO: 0.02 K/UL — SIGNIFICANT CHANGE UP (ref 0–0.2)
BASOPHILS NFR BLD AUTO: 0.1 % — SIGNIFICANT CHANGE UP (ref 0–2)
BASOPHILS NFR BLD AUTO: 0.2 % — SIGNIFICANT CHANGE UP (ref 0–2)
BILIRUB SERPL-MCNC: 0.6 MG/DL — SIGNIFICANT CHANGE UP (ref 0.2–1.2)
BUN SERPL-MCNC: 13 MG/DL — SIGNIFICANT CHANGE UP (ref 7–23)
CALCIUM SERPL-MCNC: 8.9 MG/DL — SIGNIFICANT CHANGE UP (ref 8.4–10.5)
CHLORIDE SERPL-SCNC: 107 MMOL/L — SIGNIFICANT CHANGE UP (ref 96–108)
CO2 SERPL-SCNC: 20 MMOL/L — LOW (ref 22–31)
CREAT SERPL-MCNC: 0.84 MG/DL — SIGNIFICANT CHANGE UP (ref 0.5–1.3)
CULTURE RESULTS: SIGNIFICANT CHANGE UP
EOSINOPHIL # BLD AUTO: 0.05 K/UL — SIGNIFICANT CHANGE UP (ref 0–0.5)
EOSINOPHIL # BLD AUTO: 0.06 K/UL — SIGNIFICANT CHANGE UP (ref 0–0.5)
EOSINOPHIL NFR BLD AUTO: 0.5 % — SIGNIFICANT CHANGE UP (ref 0–6)
EOSINOPHIL NFR BLD AUTO: 0.6 % — SIGNIFICANT CHANGE UP (ref 0–6)
GLUCOSE SERPL-MCNC: 103 MG/DL — HIGH (ref 70–99)
HCT VFR BLD CALC: 30.2 % — LOW (ref 34.5–45)
HCT VFR BLD CALC: 30.5 % — LOW (ref 34.5–45)
HGB BLD-MCNC: 10.2 G/DL — LOW (ref 11.5–15.5)
HGB BLD-MCNC: 9.9 G/DL — LOW (ref 11.5–15.5)
IMM GRANULOCYTES NFR BLD AUTO: 0.3 % — SIGNIFICANT CHANGE UP (ref 0–1.5)
IMM GRANULOCYTES NFR BLD AUTO: 0.5 % — SIGNIFICANT CHANGE UP (ref 0–1.5)
LYMPHOCYTES # BLD AUTO: 1.05 K/UL — SIGNIFICANT CHANGE UP (ref 1–3.3)
LYMPHOCYTES # BLD AUTO: 1.23 K/UL — SIGNIFICANT CHANGE UP (ref 1–3.3)
LYMPHOCYTES # BLD AUTO: 11.2 % — LOW (ref 13–44)
LYMPHOCYTES # BLD AUTO: 13.1 % — SIGNIFICANT CHANGE UP (ref 13–44)
MCHC RBC-ENTMCNC: 28.6 PG — SIGNIFICANT CHANGE UP (ref 27–34)
MCHC RBC-ENTMCNC: 29.2 PG — SIGNIFICANT CHANGE UP (ref 27–34)
MCHC RBC-ENTMCNC: 32.8 GM/DL — SIGNIFICANT CHANGE UP (ref 32–36)
MCHC RBC-ENTMCNC: 33.4 GM/DL — SIGNIFICANT CHANGE UP (ref 32–36)
MCV RBC AUTO: 87.3 FL — SIGNIFICANT CHANGE UP (ref 80–100)
MCV RBC AUTO: 87.4 FL — SIGNIFICANT CHANGE UP (ref 80–100)
METHOD TYPE: SIGNIFICANT CHANGE UP
MONOCYTES # BLD AUTO: 0.58 K/UL — SIGNIFICANT CHANGE UP (ref 0–0.9)
MONOCYTES # BLD AUTO: 0.59 K/UL — SIGNIFICANT CHANGE UP (ref 0–0.9)
MONOCYTES NFR BLD AUTO: 6.2 % — SIGNIFICANT CHANGE UP (ref 2–14)
MONOCYTES NFR BLD AUTO: 6.3 % — SIGNIFICANT CHANGE UP (ref 2–14)
NEUTROPHILS # BLD AUTO: 7.48 K/UL — HIGH (ref 1.8–7.4)
NEUTROPHILS # BLD AUTO: 7.6 K/UL — HIGH (ref 1.8–7.4)
NEUTROPHILS NFR BLD AUTO: 79.5 % — HIGH (ref 43–77)
NEUTROPHILS NFR BLD AUTO: 81.5 % — HIGH (ref 43–77)
NRBC # BLD: 0 /100 WBCS — SIGNIFICANT CHANGE UP (ref 0–0)
NRBC # BLD: 0 /100 WBCS — SIGNIFICANT CHANGE UP (ref 0–0)
ORGANISM # SPEC MICROSCOPIC CNT: SIGNIFICANT CHANGE UP
ORGANISM # SPEC MICROSCOPIC CNT: SIGNIFICANT CHANGE UP
PLATELET # BLD AUTO: 177 K/UL — SIGNIFICANT CHANGE UP (ref 150–400)
PLATELET # BLD AUTO: 186 K/UL — SIGNIFICANT CHANGE UP (ref 150–400)
POTASSIUM SERPL-MCNC: 3.6 MMOL/L — SIGNIFICANT CHANGE UP (ref 3.5–5.3)
POTASSIUM SERPL-SCNC: 3.6 MMOL/L — SIGNIFICANT CHANGE UP (ref 3.5–5.3)
PROT SERPL-MCNC: 5.8 G/DL — LOW (ref 6–8.3)
RBC # BLD: 3.46 M/UL — LOW (ref 3.8–5.2)
RBC # BLD: 3.49 M/UL — LOW (ref 3.8–5.2)
RBC # FLD: 12.9 % — SIGNIFICANT CHANGE UP (ref 10.3–14.5)
RBC # FLD: 13 % — SIGNIFICANT CHANGE UP (ref 10.3–14.5)
SODIUM SERPL-SCNC: 139 MMOL/L — SIGNIFICANT CHANGE UP (ref 135–145)
SPECIMEN SOURCE: SIGNIFICANT CHANGE UP
WBC # BLD: 9.34 K/UL — SIGNIFICANT CHANGE UP (ref 3.8–10.5)
WBC # BLD: 9.41 K/UL — SIGNIFICANT CHANGE UP (ref 3.8–10.5)
WBC # FLD AUTO: 9.34 K/UL — SIGNIFICANT CHANGE UP (ref 3.8–10.5)
WBC # FLD AUTO: 9.41 K/UL — SIGNIFICANT CHANGE UP (ref 3.8–10.5)

## 2021-01-31 PROCEDURE — 93975 VASCULAR STUDY: CPT

## 2021-01-31 PROCEDURE — 76830 TRANSVAGINAL US NON-OB: CPT

## 2021-01-31 PROCEDURE — 85018 HEMOGLOBIN: CPT

## 2021-01-31 PROCEDURE — 87186 SC STD MICRODIL/AGAR DIL: CPT

## 2021-01-31 PROCEDURE — 87591 N.GONORRHOEAE DNA AMP PROB: CPT

## 2021-01-31 PROCEDURE — 84295 ASSAY OF SERUM SODIUM: CPT

## 2021-01-31 PROCEDURE — 82947 ASSAY GLUCOSE BLOOD QUANT: CPT

## 2021-01-31 PROCEDURE — 82803 BLOOD GASES ANY COMBINATION: CPT

## 2021-01-31 PROCEDURE — 85730 THROMBOPLASTIN TIME PARTIAL: CPT

## 2021-01-31 PROCEDURE — 74177 CT ABD & PELVIS W/CONTRAST: CPT

## 2021-01-31 PROCEDURE — 84132 ASSAY OF SERUM POTASSIUM: CPT

## 2021-01-31 PROCEDURE — 87491 CHLMYD TRACH DNA AMP PROBE: CPT

## 2021-01-31 PROCEDURE — 80053 COMPREHEN METABOLIC PANEL: CPT

## 2021-01-31 PROCEDURE — 85025 COMPLETE CBC W/AUTO DIFF WBC: CPT

## 2021-01-31 PROCEDURE — 83605 ASSAY OF LACTIC ACID: CPT

## 2021-01-31 PROCEDURE — 82435 ASSAY OF BLOOD CHLORIDE: CPT

## 2021-01-31 PROCEDURE — 99285 EMERGENCY DEPT VISIT HI MDM: CPT | Mod: 25

## 2021-01-31 PROCEDURE — 71046 X-RAY EXAM CHEST 2 VIEWS: CPT

## 2021-01-31 PROCEDURE — 82330 ASSAY OF CALCIUM: CPT

## 2021-01-31 PROCEDURE — U0003: CPT

## 2021-01-31 PROCEDURE — 87086 URINE CULTURE/COLONY COUNT: CPT

## 2021-01-31 PROCEDURE — 96361 HYDRATE IV INFUSION ADD-ON: CPT

## 2021-01-31 PROCEDURE — 87040 BLOOD CULTURE FOR BACTERIA: CPT

## 2021-01-31 PROCEDURE — 93005 ELECTROCARDIOGRAM TRACING: CPT

## 2021-01-31 PROCEDURE — 96374 THER/PROPH/DIAG INJ IV PUSH: CPT

## 2021-01-31 PROCEDURE — 81001 URINALYSIS AUTO W/SCOPE: CPT

## 2021-01-31 PROCEDURE — 85014 HEMATOCRIT: CPT

## 2021-01-31 PROCEDURE — U0005: CPT

## 2021-01-31 PROCEDURE — 84702 CHORIONIC GONADOTROPIN TEST: CPT

## 2021-01-31 PROCEDURE — 87070 CULTURE OTHR SPECIMN AEROBIC: CPT

## 2021-01-31 PROCEDURE — 85610 PROTHROMBIN TIME: CPT

## 2021-01-31 RX ORDER — MAGNESIUM HYDROXIDE 400 MG/1
30 TABLET, CHEWABLE ORAL
Qty: 450 | Refills: 0
Start: 2021-01-31 | End: 2021-02-04

## 2021-01-31 RX ADMIN — PIPERACILLIN AND TAZOBACTAM 25 GRAM(S): 4; .5 INJECTION, POWDER, LYOPHILIZED, FOR SOLUTION INTRAVENOUS at 00:23

## 2021-01-31 RX ADMIN — SODIUM CHLORIDE 125 MILLILITER(S): 9 INJECTION, SOLUTION INTRAVENOUS at 09:15

## 2021-01-31 RX ADMIN — Medication 1 TABLET(S): at 11:43

## 2021-01-31 RX ADMIN — POLYETHYLENE GLYCOL 3350 17 GRAM(S): 17 POWDER, FOR SOLUTION ORAL at 06:24

## 2021-01-31 RX ADMIN — Medication 30 MILLIGRAM(S): at 06:18

## 2021-01-31 RX ADMIN — Medication 30 MILLIGRAM(S): at 00:23

## 2021-01-31 RX ADMIN — SODIUM CHLORIDE 125 MILLILITER(S): 9 INJECTION, SOLUTION INTRAVENOUS at 00:24

## 2021-01-31 RX ADMIN — HEPARIN SODIUM 5000 UNIT(S): 5000 INJECTION INTRAVENOUS; SUBCUTANEOUS at 09:15

## 2021-01-31 RX ADMIN — MAGNESIUM HYDROXIDE 30 MILLILITER(S): 400 TABLET, CHEWABLE ORAL at 09:19

## 2021-01-31 NOTE — PROGRESS NOTE ADULT - SUBJECTIVE AND OBJECTIVE BOX
HD#3    Pt seen and examined at bedside. No overnight events.  Pt endorsing some back pain with deep inspiration and continues to feel constipated (no BM in 5d), otherwise without complaints. Pain well controlled on current regimen. She denies SOB/CP/palpitations, fever/chills, nausea/emesis. Tolerating regular diet.  +OOB,  +Flatus, +Voiding spontaneously    MEDICATIONS  (STANDING):  heparin   Injectable 5000 Unit(s) SubCutaneous every 12 hours  lactated ringers. 1000 milliLiter(s) (125 mL/Hr) IV Continuous <Continuous>  melatonin 3 milliGRAM(s) Oral at bedtime  piperacillin/tazobactam IVPB.. 3.375 Gram(s) IV Intermittent every 8 hours  polyethylene glycol 3350 17 Gram(s) Oral every 12 hours    MEDICATIONS  (PRN):  diphenhydrAMINE 25 milliGRAM(s) Oral every 4 hours PRN Rash and/or Itching  ketorolac   Injectable 30 milliGRAM(s) IV Push every 6 hours PRN Moderate Pain (4 - 6)  magnesium hydroxide Suspension 30 milliLiter(s) Oral daily PRN Constipation  simethicone 80 milliGRAM(s) Chew two times a day PRN Gas        Vital Signs Last 24 Hrs  T(C): 36.4 (31 Jan 2021 06:11), Max: 37.6 (30 Jan 2021 17:00)  T(F): 97.6 (31 Jan 2021 06:11), Max: 99.7 (30 Jan 2021 17:00)  HR: 96 (31 Jan 2021 06:11) (96 - 112)  BP: 134/83 (31 Jan 2021 06:11) (102/53 - 134/83)  BP(mean): 91 (31 Jan 2021 00:32) (91 - 91)  RR: 16 (31 Jan 2021 06:23) (16 - 18)  SpO2: 97% (31 Jan 2021 06:23) (94% - 98%)    01-29 @ 07:01  -  01-30 @ 07:00  --------------------------------------------------------  IN: 1110 mL / OUT: 75 mL / NET: 1035 mL    01-30 @ 07:01  -  01-31 @ 06:54  --------------------------------------------------------  IN: 4633.5 mL / OUT: 1640 mL / NET: 2993.5 mL        PHYSICAL EXAM:  Gen: NAD, A+O x 3  CV: RRR  Pulm: CTA BL  Abd: Soft, minimally tender to deep palpation, obese, softly distended, no rebound or guarding  Extremities: No swelling or calf tenderness, (-) lezama bilaterally     Labs, additional tests:             9.9    9.41  )-----------( 177      ( 01-31 @ 06:19 )             30.2                11.3   14.80 )-----------( 187      ( 01-30 @ 06:57 )             34.2                12.3   19.09 )-----------( 210      ( 01-29 @ 20:31 )             36.9                15.0   20.68 )-----------( 233      ( 01-29 @ 12:12 )             43.5       01-31    139  |  107  |  13  ----------------------------<  103<H>  3.6   |  20<L>  |  0.84    Ca    8.9      31 Jan 2021 06:16    TPro  5.8<L>  /  Alb  2.5<L>  /  TBili  0.6  /  DBili  x   /  AST  10  /  ALT  11  /  AlkPhos  74  01-31

## 2021-01-31 NOTE — DISCHARGE NOTE PROVIDER - NSDCFUADDAPPT_GEN_ALL_CORE_FT
Regular diet as tolerated, regular activity as tolerated, no heavy lifting.  Nothing per vagina: no intercourse, tampons or douching.  Call your provider if you experience fevers, chills, worsening abdominal pain, inability to urinate or worsening vaginal bleeding. Follow up with your provider for your scheduled PST appointment on 2/2/2021.

## 2021-01-31 NOTE — PROVIDER CONTACT NOTE (OTHER) - ASSESSMENT
Patient denies palpitations, chest pain, shortness of breath
Pt denies any chest pain or palpitations
Pt states when she went to the bathroom around 12am she missed the hat but it was very little, Pt voided only once since then and only 75ml noted.
Palpated right and left hands simultaneously noting equal temperature and no discoloration. Capillary refill present bilaterally. Pulse ox on R hand 100%, Pulse ox on L hand 88%.

## 2021-01-31 NOTE — PROVIDER CONTACT NOTE (OTHER) - DATE AND TIME:
30-Jan-2021 05:00
30-Jan-2021 22:45
30-Jan-2021 00:30
30-Jan-2021 21:25
29-Jan-2021 22:10
31-Jan-2021 12:45

## 2021-01-31 NOTE — PROVIDER CONTACT NOTE (OTHER) - BACKGROUND
Endometriosis.
Endometrosis, hr up to 130 yesterday, EKG done yesterday Sinus Tachycardia
a/w abd. pain, r/o endometriosis
44 y/o F a/w endometriosis

## 2021-01-31 NOTE — PROVIDER CONTACT NOTE (OTHER) - NAME OF MD/NP/PA/DO NOTIFIED:
Dr. MARI Benitez
Dr. JAY De La Torre
Dr. MARI Monroy
Dr. Hinojosa [id number]
Dr. JAY De La Torre
Dr. Espinoza

## 2021-01-31 NOTE — PROGRESS NOTE ADULT - ATTENDING COMMENTS
GYN Attg:  Pt seen and examined. I agree with above assessment and plan. Pt with possible infected endomtrioma and/or ruptured cyst. PTs WBC and HR improving. Pt overall clinically improving. Cont IV ABX/fluid.  MARTIN Fonseca MD
Patient seen and examined, agree with above. By the time I saw patient, pain on inspiration has resolved, pt had small BM, repeat CBC stable, patient tolerated first dose of augmentin. No pain on exam, no fevers, WBC normal, pt feels well. Clear for d/c home with Augmentin for 2 weeks with continuation of planning for outpatient surgery.    Quinn Castle MD

## 2021-01-31 NOTE — DISCHARGE NOTE PROVIDER - CARE PROVIDER_API CALL
Phoenix Fonseca)  Obstetrics and Gynecology  [address] [place] [address], [address]  [place] [address]  Phone: [phone]  Fax: [phone]  Follow Up Time:

## 2021-01-31 NOTE — DISCHARGE NOTE PROVIDER - HOSPITAL COURSE
DANIEL CESAR is a 45y with known history of endometriosis and bilateral endometriomas who presented to the ED with abdominal pain, tachycardia, elevated WBC count. CTAP on admission showed a R ovarian endometrioma measuring 5x7cm and L ovarian endometrioma measuring 3x4cm (consistent with prior measurements) with fat stranding surrounding pelvic structures. Pt was admitted for management of infected endometriomas vs ruptured cysts. Her abdominal pain and tachycardia resolved, WBC trended down and H/H was stable at the time of discharge. DANIEL CESAR is a 45y with known history of endometriosis and bilateral endometriomas who presented to the ED with abdominal pain, tachycardia, elevated WBC count. CTAP on admission showed a R ovarian endometrioma measuring 5x7cm and L ovarian endometrioma measuring 3x4cm (consistent with prior measurements) with fat stranding surrounding pelvic structures. Pt was admitted for management of infected endometriomas vs ruptured cysts., given Zosyn. Her abdominal pain and tachycardia resolved, WBC trended down and H/H was stable at the time of discharge.

## 2021-01-31 NOTE — DISCHARGE NOTE PROVIDER - NSDCFUSCHEDAPPT_GEN_ALL_CORE_FT
DANIEL CESAR ; 02/02/2021 ; [facility]  DANIEL CESAR ; 02/16/2021 ; [facility] ASU-AmbSurg  DANIEL CESAR ; 02/16/2021 ; [facility] DANIEL [place] ; 02/22/2021 ; [facility] DANI[place] ; 03/03/2021 ; [facility]  [facility]

## 2021-01-31 NOTE — PROGRESS NOTE ADULT - ASSESSMENT
DANIEL CESAR is a 45y with known hx of endometriosis and bilateral endometriomas who presented to the ED with abdominal pain, tachycardia, elevated WBC count, and CTAP showing R ovarian endometrioma measuring 5x7cm and L ovarian endometrioma measuring 3x4cm with fat stranding surrounding pelvic structures. Pt admitted for management of infected endometriomas vs ruptured cysts..    Dilcia Hinojosa MD  OBGYN PGY2 
DANIEL CESAR is a 45y with known hx of endometriosis and bilateral endometriomas who presented to the ED with abdominal pain, tachycardia, elevated WBC count, and CTAP showing R ovarian endometrioma measuring 5x7cm and L ovarian endometrioma measuring 3x4cm with fat stranding surrounding pelvic structures. Pt admitted for management of infected endometriomas/PID.    Dilcia Hinojosa MD  OBGYN PGY2

## 2021-01-31 NOTE — PROVIDER CONTACT NOTE (OTHER) - SITUATION
HR is 119
heart rate 112
Patient states her left hand "feels numb".
Pt voided only 75 ml
HR is 114
HR is now 130

## 2021-01-31 NOTE — DISCHARGE NOTE NURSING/CASE MANAGEMENT/SOCIAL WORK - PATIENT PORTAL LINK FT
You can access the FollowMyHealth Patient Portal offered by [facility] by registering at the following website: [email or web address]. By joining [employer]’s FollowMyHealth portal, you will also be able to view your health information using other applications (apps) compatible with our system.

## 2021-01-31 NOTE — PROVIDER CONTACT NOTE (OTHER) - ACTION/TREATMENT ORDERED:
Dr. JAY De La Torre made aware.
Vital signs overnight reviewed by Dr. De La Torre.
Dr. Monroy made aware and will discuss with Dr. JAY De La Torre.
Dr. Politzer made aware to discuss with Dr. JAY De La Torre
Provider acknowledged and will visit patient to assess.
no new orders at this time

## 2021-01-31 NOTE — CHART NOTE - NSCHARTNOTEFT_GEN_A_CORE
Called to bedside by RN as pt was complaining fo "tingling" in her L hand. At the bedside L hand is cool to touch although both hands are cool. Bilateral radial pulses palpated (2+ bilaterally) and bilateral capillary refill of all 10 fingers < 2s. Pt describes "tingling" sensation over palm and digits 3-5 in L hand - ulnar nerve distribution. Pt has been sleeping in a chair at the bedside and likely has been compressing ulnar nerve along olecranon vs carpal tunnel. Diagnosis explained to pt, she was encouraged to resume sleeping in her bed, and sensation will likely resolve over time. If not she may need wrist brace during sleep.        Dilcia Hinojosa MD  OBGYN PGY2

## 2021-01-31 NOTE — DISCHARGE NOTE PROVIDER - NSDCMRMEDTOKEN_GEN_ALL_CORE_FT
amoxicillin-clavulanate 875 mg-125 mg oral tablet: 1 tab(s) orally every 12 hours  magnesium hydroxide 8% oral suspension: 30 milliliter(s) orally 3 times a day, As Needed -Constipation until stool is passed

## 2021-01-31 NOTE — PROGRESS NOTE ADULT - PROBLEM SELECTOR PLAN 1
#Neuro: back pain improved, minimal abdominal discomfort, pain well controlled with current regimen  #CV: Downtrending H/H 12.3/36.9 > 11.3/34.2 > 9.9/30.2, suspicious for ruptured endometrioma(s), VSS, will consider additional imaging    #Pulm: ROS and PE unremarkable  #ID: Afebrile, tachycardia resolved, Zosyn initiated 1/29, Covid (-) on (1/29)  #GI: tolerating reg diet, no n/v, constipation x5d, miralax and milk of mag ordered, if no BM today will order enema   #: UOP improved & adequate >1L overnight   #FEN: LR @ 125. replete electrolytes prn   #Heme: HSQ, SCDs while in bed, and early ambulation encouraged for DVT prophylaxis    Disposition: continue routine care      Dilcia Hinojosa MD  OBGYN PGY2 #Neuro: back pain improved, minimal abdominal discomfort, pain well controlled with current regimen  #CV: Downtrending H/H 12.3/36.9 > 11.3/34.2 > 9.9/30.2, suspicious for ruptured endometrioma(s), VSS, will repeat CBC @ 1100a  #Pulm: ROS and PE unremarkable  #ID: Afebrile, tachycardia resolved, Zosyn initiated 1/29, Covid (-) on (1/29)  #GI: tolerating reg diet, no n/v, constipation x5d, miralax and milk of mag ordered, if no BM today will order enema   #: UOP improved & adequate >1L overnight   #FEN: LR @ 125. replete electrolytes prn   #Heme: HSQ, SCDs while in bed, and early ambulation encouraged for DVT prophylaxis    Disposition: continue routine care      Dilcia Hinojosa MD  OBGYN PGY2 #Neuro: back pain improved, minimal abdominal discomfort, pain well controlled with current regimen  #CV: Downtrending H/H 12.3/36.9 > 11.3/34.2 > 9.9/30.2, suspicious for ruptured endometrioma(s), VSS, will repeat CBC @ 1100a  #Pulm: ROS and PE unremarkable  #ID: Afebrile, tachycardia resolved, Zosyn initiated 1/29, Covid (-) on (1/29), will switch to Augmentin today  #GI: tolerating reg diet, no n/v, constipation x5d, miralax and milk of mag ordered, if no BM today will order enema   #: UOP improved & adequate >1L overnight   #FEN: LR @ 125. replete electrolytes prn   #Heme: HSQ, SCDs while in bed, and early ambulation encouraged for DVT prophylaxis    Disposition: continue routine care      Dilcia Hinojosa MD  OBGYN PGY2

## 2021-02-03 LAB
CULTURE RESULTS: SIGNIFICANT CHANGE UP
CULTURE RESULTS: SIGNIFICANT CHANGE UP
SPECIMEN SOURCE: SIGNIFICANT CHANGE UP
SPECIMEN SOURCE: SIGNIFICANT CHANGE UP

## 2021-02-04 ENCOUNTER — FORM ENCOUNTER (OUTPATIENT)
Age: 46
End: 2021-02-04

## 2021-02-10 ENCOUNTER — OUTPATIENT (OUTPATIENT)
Dept: OUTPATIENT SERVICES | Facility: HOSPITAL | Age: 46
LOS: 1 days | End: 2021-02-10
Payer: COMMERCIAL

## 2021-02-10 ENCOUNTER — FORM ENCOUNTER (OUTPATIENT)
Age: 46
End: 2021-02-10

## 2021-02-10 VITALS
HEIGHT: 67 IN | TEMPERATURE: 98 F | OXYGEN SATURATION: 98 % | SYSTOLIC BLOOD PRESSURE: 126 MMHG | HEART RATE: 76 BPM | DIASTOLIC BLOOD PRESSURE: 86 MMHG | WEIGHT: 184.97 LBS | RESPIRATION RATE: 16 BRPM

## 2021-02-10 DIAGNOSIS — N80.9 ENDOMETRIOSIS, UNSPECIFIED: ICD-10-CM

## 2021-02-10 DIAGNOSIS — N83.201 UNSPECIFIED OVARIAN CYST, RIGHT SIDE: ICD-10-CM

## 2021-02-10 DIAGNOSIS — J32.9 CHRONIC SINUSITIS, UNSPECIFIED: Chronic | ICD-10-CM

## 2021-02-10 DIAGNOSIS — Z98.890 OTHER SPECIFIED POSTPROCEDURAL STATES: Chronic | ICD-10-CM

## 2021-02-10 LAB
BLD GP AB SCN SERPL QL: NEGATIVE — SIGNIFICANT CHANGE UP
HCT VFR BLD CALC: 41.5 % — SIGNIFICANT CHANGE UP (ref 34.5–45)
HGB BLD-MCNC: 13.4 G/DL — SIGNIFICANT CHANGE UP (ref 11.5–15.5)
MCHC RBC-ENTMCNC: 28.8 PG — SIGNIFICANT CHANGE UP (ref 27–34)
MCHC RBC-ENTMCNC: 32.3 GM/DL — SIGNIFICANT CHANGE UP (ref 32–36)
MCV RBC AUTO: 89.1 FL — SIGNIFICANT CHANGE UP (ref 80–100)
NRBC # BLD: 0 /100 WBCS — SIGNIFICANT CHANGE UP (ref 0–0)
PLATELET # BLD AUTO: 431 K/UL — HIGH (ref 150–400)
RBC # BLD: 4.66 M/UL — SIGNIFICANT CHANGE UP (ref 3.8–5.2)
RBC # FLD: 12.5 % — SIGNIFICANT CHANGE UP (ref 10.3–14.5)
RH IG SCN BLD-IMP: NEGATIVE — SIGNIFICANT CHANGE UP
WBC # BLD: 6.44 K/UL — SIGNIFICANT CHANGE UP (ref 3.8–10.5)
WBC # FLD AUTO: 6.44 K/UL — SIGNIFICANT CHANGE UP (ref 3.8–10.5)

## 2021-02-10 PROCEDURE — G0463: CPT

## 2021-02-10 PROCEDURE — 86900 BLOOD TYPING SEROLOGIC ABO: CPT

## 2021-02-10 PROCEDURE — 86850 RBC ANTIBODY SCREEN: CPT

## 2021-02-10 PROCEDURE — 86901 BLOOD TYPING SEROLOGIC RH(D): CPT

## 2021-02-10 PROCEDURE — 85027 COMPLETE CBC AUTOMATED: CPT

## 2021-02-10 RX ORDER — CHLORHEXIDINE GLUCONATE 213 G/1000ML
1 SOLUTION TOPICAL ONCE
Refills: 0 | Status: DISCONTINUED | OUTPATIENT
Start: 2021-02-22 | End: 2021-02-22

## 2021-02-10 RX ORDER — CEFOTETAN DISODIUM 1 G
2 VIAL (EA) INJECTION ONCE
Refills: 0 | Status: DISCONTINUED | OUTPATIENT
Start: 2021-02-22 | End: 2021-03-08

## 2021-02-10 RX ORDER — LIDOCAINE HCL 20 MG/ML
0.2 VIAL (ML) INJECTION ONCE
Refills: 0 | Status: DISCONTINUED | OUTPATIENT
Start: 2021-02-22 | End: 2021-02-22

## 2021-02-10 RX ORDER — SODIUM CHLORIDE 9 MG/ML
3 INJECTION INTRAMUSCULAR; INTRAVENOUS; SUBCUTANEOUS EVERY 8 HOURS
Refills: 0 | Status: DISCONTINUED | OUTPATIENT
Start: 2021-02-22 | End: 2021-02-22

## 2021-02-10 NOTE — H&P PST ADULT - NSICDXPASTMEDICALHX_GEN_ALL_CORE_FT
PAST MEDICAL HISTORY:  Gallstones with lap renay surgery to remove gallstones 2003    History of HPV infection LGSIL    Ovarian cyst scheduled total laparoscopic BL ovarian cystectomy possible BL oophorectomy & possible exploratory laparotomy 2/16/2021  hospitalized 1/30/2021 for possible rupture  placed on abx for suspected infection    S/P bunionectomy 2010, R

## 2021-02-10 NOTE — H&P PST ADULT - ATTENDING COMMENTS
pt seen and plan discussed. to proceed with planned procedure. laparoscopic bilateral ovarian cystectomies, removal of diseased tissue, possible ex lap, possible cystoscopy.     JAKY Shipman

## 2021-02-10 NOTE — H&P PST ADULT - NEGATIVE GASTROINTESTINAL SYMPTOMS
no nausea/no vomiting/no diarrhea/no constipation/no abdominal pain no nausea/no vomiting/no diarrhea/no constipation

## 2021-02-10 NOTE — H&P PST ADULT - HISTORY OF PRESENT ILLNESS
45 year old female  with PMH of ovarian cysts reports having S/P hospitalized with recurrent abdominal pain (for 3 weeks), presents with endometriosis & right ovarian complex cyst, scheduled for total laparoscopic bilateral ovarian cystectomy,  possible bilateral  oophorectomy & possible exploratory laparotomy 2021.    *** Pending pre-op COVID swab on 2021 at [facility]

## 2021-02-10 NOTE — H&P PST ADULT - NSICDXPROBLEM_GEN_ALL_CORE_FT
PROBLEM DIAGNOSES  Problem: Bilateral ovarian cysts  Assessment and Plan: scheduled total laparoscopic bilateral ovarian cystectomy possible bilateral oophorectomy & possible exploratory laparotomy   Preop cbc, t&s sent  Preop covid test on 2/13/21  Preop UCG

## 2021-02-10 NOTE — H&P PST ADULT - NSICDXPASTSURGICALHX_GEN_ALL_CORE_FT
PAST SURGICAL HISTORY:  Chronic sinus infection h/o sinus drain    H/O oral surgery for overbite 1999

## 2021-02-16 ENCOUNTER — APPOINTMENT (OUTPATIENT)
Dept: OBGYN | Facility: HOSPITAL | Age: 46
End: 2021-02-16

## 2021-02-16 PROBLEM — N83.209 UNSPECIFIED OVARIAN CYST, UNSPECIFIED SIDE: Chronic | Status: ACTIVE | Noted: 2021-01-29

## 2021-02-16 PROBLEM — Z98.890 OTHER SPECIFIED POSTPROCEDURAL STATES: Chronic | Status: ACTIVE | Noted: 2021-02-10

## 2021-02-16 PROBLEM — K80.20 CALCULUS OF GALLBLADDER WITHOUT CHOLECYSTITIS WITHOUT OBSTRUCTION: Chronic | Status: ACTIVE | Noted: 2021-01-29

## 2021-02-16 PROBLEM — Z86.19 PERSONAL HISTORY OF OTHER INFECTIOUS AND PARASITIC DISEASES: Chronic | Status: ACTIVE | Noted: 2021-02-10

## 2021-02-19 ENCOUNTER — OUTPATIENT (OUTPATIENT)
Dept: OUTPATIENT SERVICES | Facility: HOSPITAL | Age: 46
LOS: 1 days | End: 2021-02-19
Payer: COMMERCIAL

## 2021-02-19 DIAGNOSIS — J32.9 CHRONIC SINUSITIS, UNSPECIFIED: Chronic | ICD-10-CM

## 2021-02-19 DIAGNOSIS — Z11.52 ENCOUNTER FOR SCREENING FOR COVID-19: ICD-10-CM

## 2021-02-19 DIAGNOSIS — Z98.890 OTHER SPECIFIED POSTPROCEDURAL STATES: Chronic | ICD-10-CM

## 2021-02-19 LAB — SARS-COV-2 RNA SPEC QL NAA+PROBE: SIGNIFICANT CHANGE UP

## 2021-02-19 PROCEDURE — U0005: CPT

## 2021-02-19 PROCEDURE — C9803: CPT

## 2021-02-19 PROCEDURE — U0003: CPT

## 2021-02-21 ENCOUNTER — TRANSCRIPTION ENCOUNTER (OUTPATIENT)
Age: 46
End: 2021-02-21

## 2021-02-22 ENCOUNTER — APPOINTMENT (OUTPATIENT)
Dept: OBGYN | Facility: HOSPITAL | Age: 46
End: 2021-02-22

## 2021-02-22 ENCOUNTER — RESULT REVIEW (OUTPATIENT)
Age: 46
End: 2021-02-22

## 2021-02-22 ENCOUNTER — TRANSCRIPTION ENCOUNTER (OUTPATIENT)
Age: 46
End: 2021-02-22

## 2021-02-22 ENCOUNTER — OUTPATIENT (OUTPATIENT)
Dept: INPATIENT UNIT | Facility: HOSPITAL | Age: 46
LOS: 1 days | End: 2021-02-22
Payer: COMMERCIAL

## 2021-02-22 VITALS — WEIGHT: 184.97 LBS | HEIGHT: 67 IN

## 2021-02-22 VITALS
HEART RATE: 65 BPM | SYSTOLIC BLOOD PRESSURE: 150 MMHG | RESPIRATION RATE: 18 BRPM | DIASTOLIC BLOOD PRESSURE: 82 MMHG | TEMPERATURE: 97 F | OXYGEN SATURATION: 100 %

## 2021-02-22 DIAGNOSIS — Z98.890 OTHER SPECIFIED POSTPROCEDURAL STATES: Chronic | ICD-10-CM

## 2021-02-22 DIAGNOSIS — N80.9 ENDOMETRIOSIS, UNSPECIFIED: ICD-10-CM

## 2021-02-22 DIAGNOSIS — J32.9 CHRONIC SINUSITIS, UNSPECIFIED: Chronic | ICD-10-CM

## 2021-02-22 LAB — HCG UR QL: NEGATIVE — SIGNIFICANT CHANGE UP

## 2021-02-22 PROCEDURE — C1889: CPT

## 2021-02-22 PROCEDURE — 81025 URINE PREGNANCY TEST: CPT

## 2021-02-22 PROCEDURE — 88302 TISSUE EXAM BY PATHOLOGIST: CPT | Mod: 26

## 2021-02-22 PROCEDURE — 88302 TISSUE EXAM BY PATHOLOGIST: CPT

## 2021-02-22 PROCEDURE — 58662 LAPAROSCOPY EXCISE LESIONS: CPT

## 2021-02-22 PROCEDURE — 88305 TISSUE EXAM BY PATHOLOGIST: CPT

## 2021-02-22 PROCEDURE — C9399: CPT

## 2021-02-22 PROCEDURE — 58661 LAPAROSCOPY REMOVE ADNEXA: CPT

## 2021-02-22 PROCEDURE — 88305 TISSUE EXAM BY PATHOLOGIST: CPT | Mod: 26

## 2021-02-22 RX ORDER — SIMETHICONE 80 MG/1
80 TABLET, CHEWABLE ORAL EVERY 6 HOURS
Refills: 0 | Status: DISCONTINUED | OUTPATIENT
Start: 2021-02-22 | End: 2021-03-08

## 2021-02-22 RX ORDER — SIMETHICONE 80 MG/1
1 TABLET, CHEWABLE ORAL
Qty: 0 | Refills: 0 | DISCHARGE
Start: 2021-02-22

## 2021-02-22 RX ORDER — ONDANSETRON 8 MG/1
4 TABLET, FILM COATED ORAL EVERY 4 HOURS
Refills: 0 | Status: DISCONTINUED | OUTPATIENT
Start: 2021-02-22 | End: 2021-03-08

## 2021-02-22 RX ORDER — ACETAMINOPHEN 500 MG
975 TABLET ORAL EVERY 6 HOURS
Refills: 0 | Status: DISCONTINUED | OUTPATIENT
Start: 2021-02-22 | End: 2021-03-08

## 2021-02-22 RX ORDER — HYDROMORPHONE HYDROCHLORIDE 2 MG/ML
0.5 INJECTION INTRAMUSCULAR; INTRAVENOUS; SUBCUTANEOUS
Refills: 0 | Status: DISCONTINUED | OUTPATIENT
Start: 2021-02-22 | End: 2021-02-23

## 2021-02-22 RX ORDER — IBUPROFEN 200 MG
600 TABLET ORAL EVERY 6 HOURS
Refills: 0 | Status: DISCONTINUED | OUTPATIENT
Start: 2021-02-22 | End: 2021-03-08

## 2021-02-22 RX ORDER — IBUPROFEN 200 MG
1 TABLET ORAL
Qty: 0 | Refills: 0 | DISCHARGE
Start: 2021-02-22

## 2021-02-22 RX ORDER — ACETAMINOPHEN 500 MG
1000 TABLET ORAL ONCE
Refills: 0 | Status: COMPLETED | OUTPATIENT
Start: 2021-02-22 | End: 2021-02-22

## 2021-02-22 RX ORDER — KETOROLAC TROMETHAMINE 30 MG/ML
30 SYRINGE (ML) INJECTION ONCE
Refills: 0 | Status: DISCONTINUED | OUTPATIENT
Start: 2021-02-22 | End: 2021-02-22

## 2021-02-22 RX ORDER — OXYCODONE HYDROCHLORIDE 5 MG/1
5 TABLET ORAL EVERY 6 HOURS
Refills: 0 | Status: DISCONTINUED | OUTPATIENT
Start: 2021-02-22 | End: 2021-02-22

## 2021-02-22 RX ORDER — SODIUM CHLORIDE 9 MG/ML
1000 INJECTION, SOLUTION INTRAVENOUS
Refills: 0 | Status: DISCONTINUED | OUTPATIENT
Start: 2021-02-22 | End: 2021-02-23

## 2021-02-22 RX ORDER — OXYCODONE HYDROCHLORIDE 5 MG/1
1 TABLET ORAL
Qty: 5 | Refills: 0
Start: 2021-02-22

## 2021-02-22 RX ORDER — ACETAMINOPHEN 500 MG
3 TABLET ORAL
Qty: 0 | Refills: 0 | DISCHARGE
Start: 2021-02-22

## 2021-02-22 RX ADMIN — SODIUM CHLORIDE 75 MILLILITER(S): 9 INJECTION, SOLUTION INTRAVENOUS at 21:08

## 2021-02-22 RX ADMIN — Medication 400 MILLIGRAM(S): at 21:36

## 2021-02-22 RX ADMIN — Medication 30 MILLIGRAM(S): at 23:00

## 2021-02-22 NOTE — ASU DISCHARGE PLAN (ADULT/PEDIATRIC) - CARE PROVIDER_API CALL
Janell Shipman)  Obstetrics and Gynecology  [address] [place] [address]  Phone: [phone]  Fax: [phone]  Follow Up Time: 2 weeks

## 2021-02-22 NOTE — BRIEF OPERATIVE NOTE - OPERATION/FINDINGS
Exam under anesthesia revealed frozen pelvis.   Abdominal survey showed atraumatic entry, clear liver edge  Diffuse bowel adhesions to pelvic organs  General surgery intraop consult for lysis of adhesions (see separate operative note for details)  fibrinous plaque over anterior aspect of uterus  Ovaries with large endometriomas bilaterally, ruptured in situ  Aubrey, Fibrillar, and Tisseel placed throughout surgical bed

## 2021-02-22 NOTE — CHART NOTE - NSCHARTNOTEFT_GEN_A_CORE
R2 Post Op Check    Patient seen and examined at bedside, recently post-op. No acute complaints at this time. Denies CP, SOB, N/V, fevers, and chills.    Vital Signs Last 24 Hours  T(C): 36.7 (02-22-21 @ 20:25), Max: 36.7 (02-22-21 @ 20:25)  HR: 80 (02-22-21 @ 21:30) (65 - 94)  BP: 147/78 (02-22-21 @ 21:30) (100/71 - 150/82)  RR: 20 (02-22-21 @ 21:30) (18 - 20)  SpO2: 100% (02-22-21 @ 21:30) (98% - 100%)    I&O's Summary    22 Feb 2021 07:01  -  22 Feb 2021 22:28  --------------------------------------------------------  IN: 75 mL / OUT: 0 mL / NET: 75 mL      Physical Exam:  General: NAD  CV: RRR  Lungs: non-labored breathing  Abdomen: Soft, appropriately tender, softly distended  Incision: 3 port site incision, clean, dry, intact, dermabond over incision sites  Ext: No pain or swelling    MEDICATIONS  (STANDING):  acetaminophen   Tablet .. 975 milliGRAM(s) Oral every 6 hours  cefoTEtan  IVPB 2 Gram(s) IV Intermittent once  ibuprofen  Tablet. 600 milliGRAM(s) Oral every 6 hours  ketorolac   Injectable 30 milliGRAM(s) IV Push once  lactated ringers. 1000 milliLiter(s) (75 mL/Hr) IV Continuous <Continuous>    MEDICATIONS  (PRN):  HYDROmorphone  Injectable 0.5 milliGRAM(s) IV Push every 10 minutes PRN Moderate Pain (4 - 6)  ondansetron Injectable 4 milliGRAM(s) IV Push every 4 hours PRN Nausea and/or Vomiting  oxyCODONE    IR 5 milliGRAM(s) Oral every 6 hours PRN Severe Pain (7 - 10)  simethicone 80 milliGRAM(s) Chew every 6 hours PRN Gas    46 yo POD#0 s/p Lsc b/l ovarian cystectomy w/ b/l salpingectomy seen in PACU post-operatively.  Patient w/ no complaints.  Pt is stable and doing well postoperatively.    Neuro: tylenol, motrin, oxy prn  CV: VSS  Pulm: Saturating well on room air, encourage incentive spirometry  GI: advance diet as tolerated  : rizzo removed in OR, patient due to void by 4am  Heme: SCDs, early ambulation for DVT ppx  Dispo: Continue routine post-op care.  Patient due to void and PO challenge prior to d/c from PACU    Jaz De La Torre PGY2

## 2021-02-22 NOTE — ASU DISCHARGE PLAN (ADULT/PEDIATRIC) - ASU DC SPECIAL INSTRUCTIONSFT
Postoperative Instructions      Pain control     For pain control, take the followin. Motrin 600mg four times a day, take with food  2. Add Tylenol 975 four times a day, alternated with motrin  3. Add oxycodone as needed for severe pain not managed well by motrin and tylenol. A prescription has been sent to your pharmacy on file.     Motrin and Tylenol can be obtained over the counter.         Incision Care  Keep your incision(s) clean and dry. It is ok to shower, but do not scrub the incision sites--just allow the water to gently wash over your skin. Remove the outer dressing(s)--the band-aids--the second day after your surgery. There are small pieces of tape called steri-strips over the incisions underneath these dressings. Steri strips will be removed at your postoperative appointment.    Postoperative restrictions   Do not drive or make important decisions for 24 hours after anesthesia. You may feel drowsy for 24 hours and should have a responsible adult with you during that time. Nothing in the vagina (tampons, sexual intercourse), No tub baths, pools or hot tubs for 6 weeks (showers are ok!). No lifting anything heavier than 15 lbs, no strenuous exercise for 6 weeks after surgery. Do not pull or cut any stitches that you see around your incision.         Vaginal bleeding   Spotting and intermittent passage of blood clots per vagina is normal in first few weeks after surgery. If you are soaking 1 pad per hour, that is not normal and you should notify Dr. Shipman's office and seek medical attention right away.        Signs of Infection  Some postoperative pain and discomfort is normal. However, if you experience any of the following, you may be developing an infection and should be seen by your doctor: pain that does not get better with the oral medications listed above, fever greater than 100.4F, foul smelling discharge coming from the surgical site, nausea and vomiting that does not stop (especially if you are unable to tolerate oral intake), or inability to urinate. If you experience any of these symptoms, call your doctor's office to be seen right away.    Follow Up  Call Dr. Shipman's office to schedule a postoperative appointment in 2 weeks. The results from the procedure will be discussed with you at that time.

## 2021-02-22 NOTE — BRIEF OPERATIVE NOTE - NSICDXBRIEFPROCEDURE_GEN_ALL_CORE_FT
PROCEDURES:  Bilateral salpingectomy 22-Feb-2021 20:50:48  Celia Lopez  Laparoscopic ovarian cystectomy 22-Feb-2021 20:50:17  Celia Lopez

## 2021-02-23 VITALS
SYSTOLIC BLOOD PRESSURE: 134 MMHG | RESPIRATION RATE: 18 BRPM | TEMPERATURE: 98 F | DIASTOLIC BLOOD PRESSURE: 66 MMHG | HEART RATE: 84 BPM | OXYGEN SATURATION: 97 %

## 2021-02-23 NOTE — DISCHARGE NOTE NURSING/CASE MANAGEMENT/SOCIAL WORK - PATIENT PORTAL LINK FT
You can access the FollowMyHealth Patient Portal offered by [facility] by registering at the following website: [email or web address]. By joining [employer]’s FollowMyHealth portal, you will also be able to view your health information using other applications (apps) compatible with our system.

## 2021-03-01 ENCOUNTER — FORM ENCOUNTER (OUTPATIENT)
Age: 46
End: 2021-03-01

## 2021-03-02 LAB — SURGICAL PATHOLOGY STUDY: SIGNIFICANT CHANGE UP

## 2021-03-09 ENCOUNTER — FORM ENCOUNTER (OUTPATIENT)
Age: 46
End: 2021-03-09

## 2021-03-10 ENCOUNTER — APPOINTMENT (OUTPATIENT)
Dept: OBGYN | Facility: CLINIC | Age: 46
End: 2021-03-10
Payer: COMMERCIAL

## 2021-03-10 PROCEDURE — 99024 POSTOP FOLLOW-UP VISIT: CPT

## 2021-03-16 DIAGNOSIS — R92.2 INCONCLUSIVE MAMMOGRAM: ICD-10-CM

## 2021-03-16 DIAGNOSIS — R92.8 OTHER ABNORMAL AND INCONCLUSIVE FINDINGS ON DIAGNOSTIC IMAGING OF BREAST: ICD-10-CM

## 2021-05-05 ENCOUNTER — FORM ENCOUNTER (OUTPATIENT)
Age: 46
End: 2021-05-05

## 2021-07-07 ENCOUNTER — NON-APPOINTMENT (OUTPATIENT)
Age: 46
End: 2021-07-07

## 2021-07-30 ENCOUNTER — FORM ENCOUNTER (OUTPATIENT)
Age: 46
End: 2021-07-30

## 2021-08-12 ENCOUNTER — APPOINTMENT (OUTPATIENT)
Dept: OBGYN | Facility: CLINIC | Age: 46
End: 2021-08-12
Payer: COMMERCIAL

## 2021-08-12 VITALS
SYSTOLIC BLOOD PRESSURE: 138 MMHG | DIASTOLIC BLOOD PRESSURE: 84 MMHG | BODY MASS INDEX: 29.35 KG/M2 | HEIGHT: 67 IN | WEIGHT: 187 LBS

## 2021-08-12 DIAGNOSIS — Z01.419 ENCOUNTER FOR GYNECOLOGICAL EXAMINATION (GENERAL) (ROUTINE) W/OUT ABNORMAL FINDINGS: ICD-10-CM

## 2021-08-12 DIAGNOSIS — N80.9 ENDOMETRIOSIS, UNSPECIFIED: ICD-10-CM

## 2021-08-12 PROCEDURE — 99396 PREV VISIT EST AGE 40-64: CPT

## 2021-08-12 RX ORDER — NORETHINDRONE 0.35 MG/1
0.35 TABLET ORAL DAILY
Qty: 3 | Refills: 2 | Status: ACTIVE | COMMUNITY
Start: 2021-08-12 | End: 1900-01-01

## 2021-08-15 LAB
C TRACH RRNA SPEC QL NAA+PROBE: NOT DETECTED
HPV HIGH+LOW RISK DNA PNL CVX: NOT DETECTED
N GONORRHOEA RRNA SPEC QL NAA+PROBE: NOT DETECTED
SOURCE AMPLIFICATION: NORMAL

## 2021-08-17 LAB — CYTOLOGY CVX/VAG DOC THIN PREP: ABNORMAL

## 2021-08-24 ENCOUNTER — FORM ENCOUNTER (OUTPATIENT)
Age: 46
End: 2021-08-24

## 2021-08-27 ENCOUNTER — FORM ENCOUNTER (OUTPATIENT)
Age: 46
End: 2021-08-27

## 2021-08-29 ENCOUNTER — FORM ENCOUNTER (OUTPATIENT)
Age: 46
End: 2021-08-29

## 2021-10-13 ENCOUNTER — FORM ENCOUNTER (OUTPATIENT)
Age: 46
End: 2021-10-13

## 2021-10-14 ENCOUNTER — APPOINTMENT (OUTPATIENT)
Dept: OBGYN | Facility: CLINIC | Age: 46
End: 2021-10-14
Payer: COMMERCIAL

## 2021-10-14 PROCEDURE — 58300 INSERT INTRAUTERINE DEVICE: CPT

## 2021-10-14 PROCEDURE — 81025 URINE PREGNANCY TEST: CPT

## 2021-10-28 DIAGNOSIS — N80.9 ENDOMETRIOSIS, UNSPECIFIED: ICD-10-CM

## 2021-10-28 DIAGNOSIS — Z92.89 PERSONAL HISTORY OF OTHER MEDICAL TREATMENT: ICD-10-CM

## 2021-10-28 DIAGNOSIS — Z80.9 FAMILY HISTORY OF MALIGNANT NEOPLASM, UNSPECIFIED: ICD-10-CM

## 2021-11-22 ENCOUNTER — APPOINTMENT (OUTPATIENT)
Dept: OBGYN | Facility: CLINIC | Age: 46
End: 2021-11-22
Payer: COMMERCIAL

## 2021-11-22 VITALS
BODY MASS INDEX: 29.35 KG/M2 | SYSTOLIC BLOOD PRESSURE: 124 MMHG | HEIGHT: 67 IN | WEIGHT: 187 LBS | DIASTOLIC BLOOD PRESSURE: 82 MMHG

## 2021-11-22 PROCEDURE — 99213 OFFICE O/P EST LOW 20 MIN: CPT

## 2021-11-22 NOTE — HISTORY OF PRESENT ILLNESS
[FreeTextEntry1] : DANIEL CESAR is a 46 year old female who presents for follow-up evaluation, IUD check. She c/o of some spotting.

## 2021-11-22 NOTE — PHYSICAL EXAM
[Appropriately responsive] : appropriately responsive [Alert] : alert [No Acute Distress] : no acute distress [Soft] : soft [Non-tender] : non-tender [Non-distended] : non-distended [No HSM] : No HSM [No Lesions] : no lesions [No Mass] : no mass [Oriented x3] : oriented x3 [Labia Majora] : normal [Labia Minora] : normal [IUD String] : an IUD string was noted [Normal] : normal [Uterine Adnexae] : normal

## 2021-11-22 NOTE — PLAN
[FreeTextEntry1] : DANIEL CESAR is a 46 year old female who presents for follow-up evaluation, IUD check. \par \par - IUD string visualized\par - RX pelvic sono to f/u on ovarian cyst.

## 2021-11-22 NOTE — END OF VISIT
[FreeTextEntry3] : I, Andres Howard, acted solely as a scribe for Dr. Laurel Wren D.O. on this date 11/22/2021  .\par  \par All medical record entries made by the Scribe were at my, Dr. Laurel Wren D.O., direction and personally dictated by me on 11/22/2021 . I have reviewed the chart and agree that the record accurately reflects my personal performance of the history, physical exam, assessment and plan. I have also personally directed, reviewed, and agreed with the chart.

## 2022-02-09 ENCOUNTER — TRANSCRIPTION ENCOUNTER (OUTPATIENT)
Age: 47
End: 2022-02-09

## 2022-02-13 NOTE — PACU DISCHARGE NOTE - NSCLINEINSERTRD_GEN_ALL_CORE
COVID-19 Week 1 Survey      Responses   [facility] patient discharged from? [facility]   Does the patient have one of the following disease processes/diagnoses(primary or secondary)? COVID-19   COVID-19 underlying condition? None   Call Number Call 2   Week 1 Call successful? Yes   Call start time 1036   Call end time 1041   Discharge diagnosis encephalopathy d/t COVID-19 PNA, MILLY   Person spoke with today (if not patient) and relationship Julia wife    What is the patient's perception of their health status since discharge? Improving   Does the patient have any of the following symptoms? None   Pulse Ox monitoring None   COVID-19 call completed? Yes   Wrap up additional comments Patient has improved mentally and is doing well.  Wife is velia and appreciative of the calls.          Sophie Kilgore, RN   No

## 2022-04-28 NOTE — H&P PST ADULT - HEIGHT IN FEET
Mother Baby Discharge Instructions       APPOINTMENTS:  You must call your provider to schedule or change follow-up appointments. As instructed, it is very important to see your and your baby's doctor.  PLEASE CALL TO MAKE YOUR APPOINTMENTS AFTER DISCHARGE    Call your provider if:  • You have a temperature greater than 100.4 F  • Passing blood clots larger than a size of an egg  • Your bleeding goes back to bright red or has a foul odor  • You experience any signs of infection: redness, swelling, drainage or opening of surgical incision  • Preeclampsia symptoms: Stomach pain, Headaches, Feeling Nauseous, Seeing spots, Swelling in hands and face, Gaining more than 5 lbs in a week (up to 6 wks after delivery) For more information go to [email or web address].  MEDICATION  Take your medications as instructed by your provider on the Medication Reconciliation Form given to you at discharge. Please call your provider's office number if you need additional refills or have questions regarding medications once you are home.    [facility] Services:  • Mother/Baby unit: [phone]  • Lactation Consultants: [phone]  • : [phone]  • Special Care Nursery: [phone]  • Support Groups:  New Mom/Baby and Breastfeeding: Every Thursdays at 12:30 p.m.  Beyond Baby Blues: Every Tuesday at 10:00 am    Discharge Instructions: Please refer to INJOY video.  MATERNAL  Perineal care (care of your episiotomy and hemorrhoids)      Whether you have an episiotomy or not, continue to use your squirt bottle each time you use the bathroom for as long as you are bleeding. Call your provider if you notice swelling, redness or a bad odor from episiotomy site. Stitches will dissolve over next 7-10 days. Tucks can be used for hemorrhoids.  Vaginal Bleeding          Call your provider if you are saturating a heath-pad in less than one hour or bleeding becomes bright red again, or you notice a foul odor.  Bleeding  will decrease over the next 4-6 weeks after delivery.  Postpartum Depression      Mood swings, crying spells and irritability are common after giving birth. If symptoms are severe or last for two weeks or you experience difficulty caring for yourself or your baby or coping with family relations, call your provider.    Mother Baby Discharge Instructions       APPOINTMENTS:  You must call your provider to schedule or change follow-up appointments. As instructed, it is very important to see your and your baby's doctor.  PLEASE CALL TO MAKE YOUR APPOINTMENTS AFTER DISCHARGE    Call your provider if:  • You have a temperature greater than 100.4 F  • Passing blood clots larger than a size of an egg  • Your bleeding goes back to bright red or has a foul odor  • You experience any signs of infection: redness, swelling, drainage or opening of surgical incision  • Preeclampsia symptoms: Stomach pain, Headaches, Feeling Nauseous, Seeing spots, Swelling in hands and face, Gaining more than 5 lbs in a week (up to 6 wks after delivery) For more information go to [email or web address].  MEDICATION  Take your medications as instructed by your provider on the Medication Reconciliation Form given to you at discharge. Please call your provider's office number if you need additional refills or have questions regarding medications once you are home.    [facility] Services:  • Mother/Baby unit: [phone]  • Lactation Consultants: [phone]  • : [phone]  • Special Care Nursery: [phone]  • Support Groups:  New Mom/Baby and Breastfeeding: Every Thursdays at 12:30 p.m.  Beyond Baby Blues: Every Tuesday at 10:00 am    Discharge Instructions: Please refer to INJOY video.  MATERNAL  Perineal care (care of your episiotomy and hemorrhoids)      Whether you have an episiotomy or not, continue to use your squirt bottle each time you use the bathroom for as long as you are bleeding. Call your provider if  you notice swelling, redness or a bad odor from episiotomy site. Stitches will dissolve over next 7-10 days. Tucks can be used for hemorrhoids.  Vaginal Bleeding          Call your provider if you are saturating a heath-pad in less than one hour or bleeding becomes bright red again, or you notice a foul odor.  Bleeding will decrease over the next 4-6 weeks after delivery.  Postpartum Depression      Mood swings, crying spells and irritability are common after giving birth. If symptoms are severe or last for two weeks or you experience difficulty caring for yourself or your baby or coping with family relations, call your provider.    Discharge Instructions    Follow-up in the office in 3 days for a blood pressure check  Call the office at [phone] for an appointment.  Maintain pelvic rest for the next 6 weeks.  Do not lift, pull or push anything more than 10 to 15 pounds.  Nothing in the vagina (no tampons, douching or intercourse)  No soaking tub baths    When to Call  Increasing vaginal bleeding  Foul smelling or abnormal vaginal discharge  Fever, chills, nausea or vomiting  Pain unrelieved by pain medication  Any symptom worrisome to you      5

## 2022-08-24 ENCOUNTER — NON-APPOINTMENT (OUTPATIENT)
Age: 47
End: 2022-08-24

## 2022-08-29 NOTE — ASU PREOP CHECKLIST - ALLERGIES REVIEWED
[TextBox_4] : 81-year-old man who never smoked, has a history of hypertension hyperlipidemia, history of CAD status post CABG, presenting for shortness of breath especially with exertion, and a CT of the chest that was done by his primary care physician showing small nodular opacities that are faint in the right upper lobe and suggestive of perhaps mucous pluggings.  His lungs are clear on exam today.  He is on albuterol as needed and a nasal spray.   done

## 2022-11-02 ENCOUNTER — APPOINTMENT (OUTPATIENT)
Dept: OBGYN | Facility: CLINIC | Age: 47
End: 2022-11-02

## 2022-11-02 VITALS
WEIGHT: 188 LBS | BODY MASS INDEX: 29.51 KG/M2 | SYSTOLIC BLOOD PRESSURE: 140 MMHG | DIASTOLIC BLOOD PRESSURE: 80 MMHG | HEIGHT: 67 IN

## 2022-11-02 DIAGNOSIS — N39.3 STRESS INCONTINENCE (FEMALE) (MALE): ICD-10-CM

## 2022-11-02 LAB
BILIRUB UR QL STRIP: NORMAL
CLARITY UR: NORMAL
COLLECTION METHOD: NORMAL
GLUCOSE UR-MCNC: NORMAL
HCG UR QL: 0.2 EU/DL
HGB UR QL STRIP.AUTO: NORMAL
KETONES UR-MCNC: NORMAL
LEUKOCYTE ESTERASE UR QL STRIP: NORMAL
NITRITE UR QL STRIP: NORMAL
PH UR STRIP: 5
PROT UR STRIP-MCNC: NORMAL
SP GR UR STRIP: 1.03

## 2022-11-02 PROCEDURE — 81003 URINALYSIS AUTO W/O SCOPE: CPT | Mod: QW

## 2022-11-02 PROCEDURE — 99396 PREV VISIT EST AGE 40-64: CPT

## 2022-11-02 NOTE — PHYSICAL EXAM
[Chaperone Declined] : Patient declined chaperone [Appropriately responsive] : appropriately responsive [Alert] : alert [No Acute Distress] : no acute distress [Soft] : soft [Non-tender] : non-tender [Non-distended] : non-distended [No HSM] : No HSM [No Lesions] : no lesions [No Mass] : no mass [Oriented x3] : oriented x3 [Examination Of The Breasts] : a normal appearance [No Masses] : no breast masses were palpable [Labia Majora] : normal [Labia Minora] : normal [IUD String] : an IUD string was noted [Normal] : normal [Uterine Adnexae] : normal

## 2022-11-03 LAB
APPEARANCE: CLEAR
BACTERIA: NEGATIVE
BILIRUBIN URINE: NEGATIVE
BLOOD URINE: NEGATIVE
COLOR: YELLOW
GLUCOSE QUALITATIVE U: NEGATIVE
HYALINE CASTS: 0 /LPF
KETONES URINE: NEGATIVE
LEUKOCYTE ESTERASE URINE: NEGATIVE
MICROSCOPIC-UA: NORMAL
NITRITE URINE: NEGATIVE
PH URINE: 5.5
PROTEIN URINE: ABNORMAL
RED BLOOD CELLS URINE: 6 /HPF
SPECIFIC GRAVITY URINE: 1.03
SQUAMOUS EPITHELIAL CELLS: 9 /HPF
UROBILINOGEN URINE: NORMAL
WHITE BLOOD CELLS URINE: 1 /HPF

## 2022-11-05 LAB — BACTERIA UR CULT: NORMAL

## 2022-11-30 ENCOUNTER — APPOINTMENT (OUTPATIENT)
Dept: OBGYN | Facility: CLINIC | Age: 47
End: 2022-11-30

## 2022-11-30 DIAGNOSIS — N83.209 UNSPECIFIED OVARIAN CYST, UNSPECIFIED SIDE: ICD-10-CM

## 2022-11-30 PROCEDURE — 99213 OFFICE O/P EST LOW 20 MIN: CPT | Mod: 95

## 2022-11-30 NOTE — HISTORY OF PRESENT ILLNESS
[Other Location: e.g. School (Enter Location, City,State)___] : at [unfilled], at the time of the visit. [Medical Office: ([facility] [place])___] : at the medical office located in  [Verbal consent obtained from patient] : the patient, [unfilled]

## 2022-12-20 NOTE — H&P PST ADULT - RESPIRATORY AND THORAX
Covered @ 100%  36v/36wks, addt'l 36v based on med [facility]  Ref #: [id number]  ~rudy  
details…

## 2023-09-15 NOTE — PROCEDURE
SOCIAL WORK DISCHARGE PLANNING ASSESSMENT    Sw completed discharge planning assessment with pt. Pt was easily engaged and education on the role of  was provided. Pt reported he lives alone but has great support from his brother Raul ([phone]). Pt reported he has the following DME: CPAP. Pt stated he is not current with  services. Pt stated he or Raul drives himself to doctor appointments and Raul will provide transportation to family home following discharge. Pt was encouraged to call with any questions or concerns. Pt verbalized understanding.     Future Appointments   Date Time Provider Department Center   9/26/2023  1:00 PM Elizabeth Arellano FNP [facility]        Patient Active Problem List   Diagnosis    Hx of gastric bypass    Sleep apnea with use of continuous positive airway pressure (CPAP)    GI hemorrhage    Essential hypertension    CKD (chronic kidney disease) stage 3, GFR 30-59 ml/min    A-fib    Anemia due to vitamin B12 deficiency    Severe obesity (BMI >= 40)    Nontraumatic complete tear of right rotator cuff    Complete rotator cuff tear of left shoulder    S/P left rotator cuff repair    Chronic bilateral low back pain without sciatica    Chronic venous insufficiency of lower extremity    Varicose veins of bilateral lower extremities with other complications    Chronic venous stasis dermatitis of both lower extremities    Symptomatic anemia    Long term (current) use of anticoagulants    Chronic blood loss anemia    Benign hypertensive renal disease    Iron deficiency anemia due to chronic blood loss    Lumbar radiculopathy    Greater trochanteric bursitis of left hip    Left hip pain    Spondylosis of lumbar region without myelopathy or radiculopathy    Chronic pain    Sacroiliac joint pain    Sacroiliitis    Onychomycosis of right great toe    Tinea pedis    Ingrowing nail    Numbness of toes    Rash of both feet    Other chest pain    Osteoarthritis  of lumbar spine    Acute pain of right shoulder    Traumatic complete tear of right rotator cuff    Recurrent abdominal hernia    Hyperkalemia    HTN (hypertension)    Anxiety and depression    Elevated serum creatinine      09/15/23 0906   Discharge Assessment   Assessment Type Discharge Planning Assessment   Confirmed/corrected address, phone number and insurance Yes   Confirmed Demographics Correct on Facesheet   Source of Information patient   Communicated JADYN with patient/caregiver Date not available/Unable to determine   People in Home alone   Facility Arrived From: Home   Do you expect to return to your current living situation? Yes   Do you have help at home or someone to help you manage your care at home? Yes   Who are your caregiver(s) and their phone number(s)? pt's brother Raul [phone]   Prior to hospitilization cognitive status: Alert/Oriented   Current cognitive status: Alert/Oriented   Equipment Currently Used at Home none   Readmission within 30 days? No   Patient currently being followed by outpatient case management? No   Do you currently have service(s) that help you manage your care at home? No   Do you take prescription medications? Yes   Do you have prescription coverage? Yes   Coverage [employer]   Do you have any problems affording any of your prescribed medications? No   Is the patient taking medications as prescribed? yes   Who is going to help you get home at discharge? pt's brother Raul [phone]   How do you get to doctors appointments? family or friend will provide;car, drives self   Are you on dialysis? No   Do you take coumadin? No   DME Needed Upon Discharge    (TBD)   Discharge Plan discussed with: Patient   Transition of Care Barriers None   Discharge Plan A Home with family        [Cervical Pap Smear] : cervical Pap smear [Tolerated Well] : the patient tolerated the procedure well [Liquid Base] : liquid base [No Complications] : there were no complications

## 2023-11-22 NOTE — ASU PREOP CHECKLIST - WARM FLUIDS/WARM BLANKETS
What Is The Reason For Today's Visit?: Preventative Skin Check Additional History: Pt reports w/o concerns of any new/changing lesions at this time. no

## 2024-05-07 ENCOUNTER — APPOINTMENT (OUTPATIENT)
Dept: OBGYN | Facility: CLINIC | Age: 49
End: 2024-05-07
Payer: COMMERCIAL

## 2024-05-07 VITALS
HEIGHT: 67 IN | BODY MASS INDEX: 27.31 KG/M2 | WEIGHT: 174 LBS | SYSTOLIC BLOOD PRESSURE: 141 MMHG | DIASTOLIC BLOOD PRESSURE: 84 MMHG

## 2024-05-07 DIAGNOSIS — Z11.3 ENCOUNTER FOR SCREENING FOR INFECTIONS WITH A PREDOMINANTLY SEXUAL MODE OF TRANSMISSION: ICD-10-CM

## 2024-05-07 DIAGNOSIS — Z01.419 ENCOUNTER FOR GYNECOLOGICAL EXAMINATION (GENERAL) (ROUTINE) W/OUT ABNORMAL FINDINGS: ICD-10-CM

## 2024-05-07 PROCEDURE — 99396 PREV VISIT EST AGE 40-64: CPT

## 2024-05-07 NOTE — HISTORY OF PRESENT ILLNESS
[FreeTextEntry1] : 47yo  here for wellness. Mirena IUD in place since 10/2021, placed during b/l cystectomies/salpingectomies with [facility] for endo. Still has intermittent pelvic pain. Also notes increasing HA, has not yet followed up with a [facility] specialist.  Sono 2022 b/l ovarian cysts, 3.8cm on L and 6cm on R with thin hairline septation. Sono 2022 iwth uterus 7x5cm, IUD in cavity, R ov 5x5cm with R ov cyst with thin septations 4.7x3.4cm (prev 3.8cm), 3cm R paraovarian cyst, possible bl hydrosalpinges  HCM - pap 2021 nilm/hpv neg - mammo 2024 birads 1 - colonoscopy 2022 with +polyp  POB: FT  x 2 (3- girl, 13- boy), vtop x 2 Gyn: hx LSIL pap/HPV+ 2019 with neg colpo

## 2024-05-08 LAB
C TRACH RRNA SPEC QL NAA+PROBE: NOT DETECTED
HBV SURFACE AG SER QL: NONREACTIVE
HCV AB SER QL: NONREACTIVE
HCV S/CO RATIO: 0.08 S/CO
HIV1+2 AB SPEC QL IA.RAPID: NONREACTIVE
HPV HIGH+LOW RISK DNA PNL CVX: NOT DETECTED
N GONORRHOEA RRNA SPEC QL NAA+PROBE: NOT DETECTED
SOURCE TP AMPLIFICATION: NORMAL
T PALLIDUM AB SER QL IA: NEGATIVE

## 2024-05-12 LAB — CYTOLOGY CVX/VAG DOC THIN PREP: NORMAL

## 2024-06-17 ENCOUNTER — NON-APPOINTMENT (OUTPATIENT)
Age: 49
End: 2024-06-17
